# Patient Record
Sex: MALE | Race: WHITE | NOT HISPANIC OR LATINO | Employment: OTHER | URBAN - METROPOLITAN AREA
[De-identification: names, ages, dates, MRNs, and addresses within clinical notes are randomized per-mention and may not be internally consistent; named-entity substitution may affect disease eponyms.]

---

## 2023-04-04 LAB
AMPHETAMINE (PRESENCE) IN URINE BY SCREEN METHOD: NORMAL
ANION GAP IN SER/PLAS: 13 MMOL/L (ref 10–20)
BARBITURATES PRESENCE IN URINE BY SCREEN METHOD: NORMAL
BENZODIAZEPINE (PRESENCE) IN URINE BY SCREEN METHOD: NORMAL
CALCIUM (MG/DL) IN SER/PLAS: 9.6 MG/DL (ref 8.6–10.6)
CANNABINOIDS IN URINE BY SCREEN METHOD: NORMAL
CARBON DIOXIDE, TOTAL (MMOL/L) IN SER/PLAS: 25 MMOL/L (ref 21–32)
CHLORIDE (MMOL/L) IN SER/PLAS: 110 MMOL/L (ref 98–107)
CHOLESTEROL (MG/DL) IN SER/PLAS: 122 MG/DL (ref 0–199)
CHOLESTEROL IN HDL (MG/DL) IN SER/PLAS: 56 MG/DL
CHOLESTEROL/HDL RATIO: 2.2
COBALAMIN (VITAMIN B12) (PG/ML) IN SER/PLAS: 609 PG/ML (ref 211–911)
COCAINE (PRESENCE) IN URINE BY SCREEN METHOD: NORMAL
CREATININE (MG/DL) IN SER/PLAS: 1.73 MG/DL (ref 0.5–1.3)
DRUG SCREEN COMMENT URINE: NORMAL
ESTIMATED AVERAGE GLUCOSE FOR HBA1C: 134 MG/DL
FENTANYL URINE: NORMAL
GFR MALE: 41 ML/MIN/1.73M2
GLUCOSE (MG/DL) IN SER/PLAS: 92 MG/DL (ref 74–99)
HEMOGLOBIN A1C/HEMOGLOBIN TOTAL IN BLOOD: 6.3 %
LDL: 55 MG/DL (ref 0–99)
METHADONE (PRESENCE) IN URINE BY SCREEN METHOD: NORMAL
OPIATES (PRESENCE) IN URINE BY SCREEN METHOD: NORMAL
OXYCODONE (PRESENCE) IN URINE BY SCREEN METHOD: NORMAL
PHENCYCLIDINE (PRESENCE) IN URINE BY SCREEN METHOD: NORMAL
POTASSIUM (MMOL/L) IN SER/PLAS: 4.4 MMOL/L (ref 3.5–5.3)
SODIUM (MMOL/L) IN SER/PLAS: 144 MMOL/L (ref 136–145)
TRIGLYCERIDE (MG/DL) IN SER/PLAS: 53 MG/DL (ref 0–149)
UREA NITROGEN (MG/DL) IN SER/PLAS: 17 MG/DL (ref 6–23)
VLDL: 11 MG/DL (ref 0–40)

## 2023-07-13 PROBLEM — E11.40 DIABETIC NEUROPATHY (MULTI): Status: ACTIVE | Noted: 2023-07-13

## 2023-07-13 PROBLEM — E78.5 HYPERLIPIDEMIA: Status: ACTIVE | Noted: 2023-07-13

## 2023-07-13 PROBLEM — I49.8 ARRHYTHMIA, ATRIAL: Status: ACTIVE | Noted: 2023-07-13

## 2023-07-13 PROBLEM — M17.0 OSTEOARTHRITIS OF BOTH KNEES: Status: ACTIVE | Noted: 2023-07-13

## 2023-07-13 PROBLEM — N28.9 RENAL DYSFUNCTION: Status: ACTIVE | Noted: 2023-07-13

## 2023-07-13 PROBLEM — G89.29 CHRONIC RIGHT SHOULDER PAIN: Status: ACTIVE | Noted: 2023-07-13

## 2023-07-13 PROBLEM — E11.9 TYPE 2 DIABETES MELLITUS (MULTI): Status: ACTIVE | Noted: 2023-07-13

## 2023-07-13 PROBLEM — M25.511 CHRONIC RIGHT SHOULDER PAIN: Status: ACTIVE | Noted: 2023-07-13

## 2023-07-13 PROBLEM — R41.3 MEMORY CHANGE: Status: ACTIVE | Noted: 2023-07-13

## 2023-07-13 PROBLEM — K42.9 UMBILICAL HERNIA: Status: ACTIVE | Noted: 2023-07-13

## 2023-07-13 PROBLEM — N40.0 BPH (BENIGN PROSTATIC HYPERPLASIA): Status: ACTIVE | Noted: 2023-07-13

## 2023-07-13 RX ORDER — MONTELUKAST SODIUM 10 MG/1
1 TABLET ORAL DAILY
COMMUNITY
Start: 2013-01-15 | End: 2024-01-03

## 2023-07-13 RX ORDER — ATORVASTATIN CALCIUM 80 MG/1
1 TABLET, FILM COATED ORAL DAILY
COMMUNITY
Start: 2017-01-20

## 2023-07-13 RX ORDER — PIOGLITAZONEHYDROCHLORIDE 15 MG/1
1 TABLET ORAL DAILY
COMMUNITY
Start: 2019-06-17 | End: 2024-01-03

## 2023-07-13 RX ORDER — OMEPRAZOLE 20 MG/1
20 CAPSULE, DELAYED RELEASE ORAL
COMMUNITY
Start: 2023-03-10

## 2023-07-13 RX ORDER — GLIMEPIRIDE 2 MG/1
1 TABLET ORAL DAILY
COMMUNITY
Start: 2017-01-20

## 2023-07-13 RX ORDER — CYCLOBENZAPRINE HCL 10 MG
TABLET ORAL
COMMUNITY
Start: 2015-02-25

## 2023-07-13 RX ORDER — PREGABALIN 150 MG/1
1 CAPSULE ORAL 2 TIMES DAILY
COMMUNITY
Start: 2021-09-16 | End: 2023-10-17

## 2023-07-13 RX ORDER — TAMSULOSIN HYDROCHLORIDE 0.4 MG/1
1 CAPSULE ORAL DAILY
COMMUNITY
Start: 2012-11-20

## 2023-07-18 ENCOUNTER — APPOINTMENT (OUTPATIENT)
Dept: PRIMARY CARE | Facility: CLINIC | Age: 73
End: 2023-07-18
Payer: MEDICARE

## 2023-08-15 ENCOUNTER — OFFICE VISIT (OUTPATIENT)
Dept: PRIMARY CARE | Facility: CLINIC | Age: 73
End: 2023-08-15
Payer: MEDICARE

## 2023-08-15 VITALS
BODY MASS INDEX: 27.75 KG/M2 | WEIGHT: 222 LBS | HEART RATE: 82 BPM | DIASTOLIC BLOOD PRESSURE: 60 MMHG | TEMPERATURE: 96.6 F | SYSTOLIC BLOOD PRESSURE: 112 MMHG | OXYGEN SATURATION: 94 %

## 2023-08-15 DIAGNOSIS — Z00.00 ROUTINE GENERAL MEDICAL EXAMINATION AT HEALTH CARE FACILITY: Primary | ICD-10-CM

## 2023-08-15 DIAGNOSIS — E11.9 TYPE 2 DIABETES MELLITUS WITHOUT COMPLICATION, UNSPECIFIED WHETHER LONG TERM INSULIN USE (MULTI): ICD-10-CM

## 2023-08-15 LAB — HBA1C MFR BLD: 6.2 % (ref 4.2–6.5)

## 2023-08-15 PROCEDURE — 3044F HG A1C LEVEL LT 7.0%: CPT | Performed by: STUDENT IN AN ORGANIZED HEALTH CARE EDUCATION/TRAINING PROGRAM

## 2023-08-15 PROCEDURE — 99213 OFFICE O/P EST LOW 20 MIN: CPT | Performed by: STUDENT IN AN ORGANIZED HEALTH CARE EDUCATION/TRAINING PROGRAM

## 2023-08-15 PROCEDURE — 83036 HEMOGLOBIN GLYCOSYLATED A1C: CPT | Mod: CLIA WAIVED TEST | Performed by: STUDENT IN AN ORGANIZED HEALTH CARE EDUCATION/TRAINING PROGRAM

## 2023-08-15 PROCEDURE — G0439 PPPS, SUBSEQ VISIT: HCPCS | Performed by: STUDENT IN AN ORGANIZED HEALTH CARE EDUCATION/TRAINING PROGRAM

## 2023-08-15 PROCEDURE — 3074F SYST BP LT 130 MM HG: CPT | Performed by: STUDENT IN AN ORGANIZED HEALTH CARE EDUCATION/TRAINING PROGRAM

## 2023-08-15 PROCEDURE — 3078F DIAST BP <80 MM HG: CPT | Performed by: STUDENT IN AN ORGANIZED HEALTH CARE EDUCATION/TRAINING PROGRAM

## 2023-08-15 PROCEDURE — 1170F FXNL STATUS ASSESSED: CPT | Performed by: STUDENT IN AN ORGANIZED HEALTH CARE EDUCATION/TRAINING PROGRAM

## 2023-08-15 RX ORDER — MULTIVITAMIN
1 TABLET ORAL DAILY
COMMUNITY

## 2023-08-15 RX ORDER — CHOLECALCIFEROL (VITAMIN D3) 50 MCG
50 TABLET ORAL DAILY
COMMUNITY

## 2023-08-15 RX ORDER — CETIRIZINE HYDROCHLORIDE 10 MG/1
10 TABLET ORAL DAILY
COMMUNITY

## 2023-08-15 ASSESSMENT — ACTIVITIES OF DAILY LIVING (ADL)
TAKING_MEDICATION: INDEPENDENT
GROCERY_SHOPPING: INDEPENDENT
MANAGING_FINANCES: INDEPENDENT
DOING_HOUSEWORK: INDEPENDENT
BATHING: INDEPENDENT
DRESSING: INDEPENDENT

## 2023-08-15 ASSESSMENT — PATIENT HEALTH QUESTIONNAIRE - PHQ9
SUM OF ALL RESPONSES TO PHQ9 QUESTIONS 1 AND 2: 0
2. FEELING DOWN, DEPRESSED OR HOPELESS: NOT AT ALL
1. LITTLE INTEREST OR PLEASURE IN DOING THINGS: NOT AT ALL

## 2023-08-15 ASSESSMENT — ENCOUNTER SYMPTOMS
LOSS OF SENSATION IN FEET: 0
DEPRESSION: 0
OCCASIONAL FEELINGS OF UNSTEADINESS: 0

## 2023-08-15 NOTE — PROGRESS NOTES
Subjective   Reason for Visit: Andi Jovel is an 73 y.o. male here for a Medicare Wellness visit.          HPI    DM: well controlled on current meds with diabetic polyneuropathy on lyrica for which helps    HLD; stable at goal    Stress; taking care of wife with recent amputation      Utd on colonscopy  Recommend prevnar    controOARRS:  No data recorded  I have personally reviewed the OARRS report for Andi Jovel. I have considered the risks of abuse, dependence, addiction and diversion    Is the patient prescribed a combination of a benzodiazepine and opioid?  No    Last Urine Drug Screen / ordered today: No  Recent Results (from the past 01902 hour(s))   Drug Screen, Urine With Reflex to Confirmation    Collection Time: 04/04/23  1:33 PM   Result Value Ref Range    DRUG SCREEN COMMENT URINE SEE BELOW     Amphetamine Screen, Urine PRESUMPTIVE NEGATIVE NEGATIVE    Barbiturate Screen, Urine PRESUMPTIVE NEGATIVE NEGATIVE    BENZODIAZEPINE (PRESENCE) IN URINE BY SCREEN METHOD PRESUMPTIVE NEGATIVE NEGATIVE    Cannabinoid Screen, Urine PRESUMPTIVE NEGATIVE NEGATIVE    Cocaine Screen, Urine PRESUMPTIVE NEGATIVE NEGATIVE    Fentanyl, Ur PRESUMPTIVE NEGATIVE NEGATIVE    Methadone Screen, Urine PRESUMPTIVE NEGATIVE NEGATIVE    Opiate Screen, Urine PRESUMPTIVE NEGATIVE NEGATIVE    Oxycodone Screen, Ur PRESUMPTIVE NEGATIVE NEGATIVE    PCP Screen, Urine PRESUMPTIVE NEGATIVE NEGATIVE     Results are as expected.     Controlled Substance Agreement:  Date of the Last Agreement: 8/15/23  Reviewed Controlled Substance Agreement including but not limited to the benefits, risks, and alternatives to treatment with a Controlled Substance medication(s).    Lyrica:  What is the patient's goal of therapy? Diabetic neuropathy  Is this being achieved with current treatment? yes    Pain Assessment:  No data recorded    Activities of Daily Living:  Is your overall impression that this patient is benefiting (symptom reduction  outweighs side effects) from Lyrica therapy? Yes     1. Physical Functioning: Better  2. Family Relationship: Better  3. Social Relationship: Better  4. Mood: Better  5. Sleep Patterns: Better  6. Overall Function: Better      Patient Care Team:  Oni Roy DO as PCP - General (Internal Medicine)  Lul Wray MD as PCP - [facility] Attributed Provider     Review of Systems   All other systems reviewed and are negative.      Objective   Vitals:  /60 (BP Location: Right arm, Patient Position: Sitting, BP Cuff Size: Adult)   Pulse 82   Temp 35.9 °C (96.6 °F)   Wt 101 kg (222 lb)   SpO2 94%   BMI 27.75 kg/m²       Physical Exam  Constitutional:       Appearance: Normal appearance.   HENT:      Head: Normocephalic and atraumatic.      Right Ear: Tympanic membrane and ear canal normal.      Left Ear: Tympanic membrane and ear canal normal.      Mouth/Throat:      Mouth: Mucous membranes are moist.      Pharynx: Oropharynx is clear.   Eyes:      Extraocular Movements: Extraocular movements intact.      Conjunctiva/sclera: Conjunctivae normal.      Pupils: Pupils are equal, round, and reactive to light.   Cardiovascular:      Rate and Rhythm: Normal rate and regular rhythm.      Pulses: Normal pulses.      Heart sounds: Normal heart sounds.   Pulmonary:      Effort: Pulmonary effort is normal.      Breath sounds: Normal breath sounds.   Abdominal:      General: Abdomen is flat. Bowel sounds are normal.      Palpations: Abdomen is soft.   Musculoskeletal:         General: Normal range of motion.      Cervical back: Normal range of motion and neck supple.   Skin:     General: Skin is warm and dry.      Capillary Refill: Capillary refill takes 2 to 3 seconds.   Neurological:      General: No focal deficit present.      Mental Status: He is alert and oriented to person, place, and time. Mental status is at baseline.   Psychiatric:         Mood and Affect: Mood normal.         Behavior: Behavior normal.          Thought Content: Thought content normal.         Judgment: Judgment normal.         Assessment/Plan   Problem List Items Addressed This Visit       Type 2 diabetes mellitus (CMS/[facility])    Relevant Orders    POCT Glycosylated Hemoglobin (HGB A1C) docked device     1. Type 2 diabetes mellitus without complication, unspecified whether long term insulin use (CMS/[facility])  A1c well controlled continue meds  - POCT Glycosylated Hemoglobin (HGB A1C) docked device    2. Routine general medical examination at health care facility  - recent labs reviewed no issues  - 1 Year Follow Up In Primary Care - Wellness Exam; Future    3 Neuropathy  - oarrs reviewed  - csa signed  - lyrica refills as needed  - follow up in 6 months

## 2023-10-16 DIAGNOSIS — E11.40 TYPE 2 DIABETES MELLITUS WITH DIABETIC NEUROPATHY, UNSPECIFIED (MULTI): ICD-10-CM

## 2023-10-17 RX ORDER — PREGABALIN 150 MG/1
150 CAPSULE ORAL 2 TIMES DAILY
Qty: 60 CAPSULE | Refills: 3 | Status: SHIPPED | OUTPATIENT
Start: 2023-10-17 | End: 2024-02-19

## 2023-11-06 DIAGNOSIS — E11.9 TYPE 2 DIABETES MELLITUS WITHOUT COMPLICATION, WITHOUT LONG-TERM CURRENT USE OF INSULIN (MULTI): Primary | ICD-10-CM

## 2024-01-03 DIAGNOSIS — E11.9 TYPE 2 DIABETES MELLITUS WITHOUT COMPLICATION, WITHOUT LONG-TERM CURRENT USE OF INSULIN (MULTI): Primary | ICD-10-CM

## 2024-01-03 DIAGNOSIS — T78.40XA ALLERGY, INITIAL ENCOUNTER: ICD-10-CM

## 2024-01-03 RX ORDER — MONTELUKAST SODIUM 10 MG/1
10 TABLET ORAL DAILY
Qty: 90 TABLET | Refills: 3 | Status: SHIPPED | OUTPATIENT
Start: 2024-01-03

## 2024-01-03 RX ORDER — PIOGLITAZONEHYDROCHLORIDE 15 MG/1
15 TABLET ORAL DAILY
Qty: 90 TABLET | Refills: 3 | Status: SHIPPED | OUTPATIENT
Start: 2024-01-03

## 2024-02-15 ENCOUNTER — OFFICE VISIT (OUTPATIENT)
Dept: PRIMARY CARE | Facility: CLINIC | Age: 74
End: 2024-02-15
Payer: MEDICARE

## 2024-02-15 VITALS
BODY MASS INDEX: 28 KG/M2 | WEIGHT: 224 LBS | SYSTOLIC BLOOD PRESSURE: 122 MMHG | HEART RATE: 68 BPM | OXYGEN SATURATION: 99 % | DIASTOLIC BLOOD PRESSURE: 62 MMHG

## 2024-02-15 DIAGNOSIS — E11.42 DIABETIC POLYNEUROPATHY ASSOCIATED WITH TYPE 2 DIABETES MELLITUS (MULTI): Primary | ICD-10-CM

## 2024-02-15 DIAGNOSIS — E11.9 TYPE 2 DIABETES MELLITUS WITHOUT COMPLICATION, UNSPECIFIED WHETHER LONG TERM INSULIN USE (MULTI): ICD-10-CM

## 2024-02-15 LAB — HBA1C MFR BLD: 6.5 % (ref 4.2–6.5)

## 2024-02-15 PROCEDURE — 3078F DIAST BP <80 MM HG: CPT | Performed by: STUDENT IN AN ORGANIZED HEALTH CARE EDUCATION/TRAINING PROGRAM

## 2024-02-15 PROCEDURE — 90677 PCV20 VACCINE IM: CPT | Performed by: STUDENT IN AN ORGANIZED HEALTH CARE EDUCATION/TRAINING PROGRAM

## 2024-02-15 PROCEDURE — G0008 ADMIN INFLUENZA VIRUS VAC: HCPCS | Performed by: STUDENT IN AN ORGANIZED HEALTH CARE EDUCATION/TRAINING PROGRAM

## 2024-02-15 PROCEDURE — G0009 ADMIN PNEUMOCOCCAL VACCINE: HCPCS | Performed by: STUDENT IN AN ORGANIZED HEALTH CARE EDUCATION/TRAINING PROGRAM

## 2024-02-15 PROCEDURE — 99214 OFFICE O/P EST MOD 30 MIN: CPT | Performed by: STUDENT IN AN ORGANIZED HEALTH CARE EDUCATION/TRAINING PROGRAM

## 2024-02-15 PROCEDURE — 3074F SYST BP LT 130 MM HG: CPT | Performed by: STUDENT IN AN ORGANIZED HEALTH CARE EDUCATION/TRAINING PROGRAM

## 2024-02-15 PROCEDURE — 83036 HEMOGLOBIN GLYCOSYLATED A1C: CPT | Mod: CLIA WAIVED TEST | Performed by: STUDENT IN AN ORGANIZED HEALTH CARE EDUCATION/TRAINING PROGRAM

## 2024-02-15 PROCEDURE — 90662 IIV NO PRSV INCREASED AG IM: CPT | Performed by: STUDENT IN AN ORGANIZED HEALTH CARE EDUCATION/TRAINING PROGRAM

## 2024-02-15 PROCEDURE — 1159F MED LIST DOCD IN RCRD: CPT | Performed by: STUDENT IN AN ORGANIZED HEALTH CARE EDUCATION/TRAINING PROGRAM

## 2024-02-15 PROCEDURE — 3044F HG A1C LEVEL LT 7.0%: CPT | Performed by: STUDENT IN AN ORGANIZED HEALTH CARE EDUCATION/TRAINING PROGRAM

## 2024-02-15 PROCEDURE — 1036F TOBACCO NON-USER: CPT | Performed by: STUDENT IN AN ORGANIZED HEALTH CARE EDUCATION/TRAINING PROGRAM

## 2024-02-15 ASSESSMENT — PATIENT HEALTH QUESTIONNAIRE - PHQ9
2. FEELING DOWN, DEPRESSED OR HOPELESS: SEVERAL DAYS
10. IF YOU CHECKED OFF ANY PROBLEMS, HOW DIFFICULT HAVE THESE PROBLEMS MADE IT FOR YOU TO DO YOUR WORK, TAKE CARE OF THINGS AT HOME, OR GET ALONG WITH OTHER PEOPLE: NOT DIFFICULT AT ALL
1. LITTLE INTEREST OR PLEASURE IN DOING THINGS: NOT AT ALL
SUM OF ALL RESPONSES TO PHQ9 QUESTIONS 1 AND 2: 1

## 2024-02-15 NOTE — PROGRESS NOTES
Subjective   Patient ID: Andi Jovel is a 73 y.o. male who presents for Follow-up (6 months/Diabetes ).    HPI     DM: well controlled on current meds with diabetic polyneuropathy on lyrica for which helps     HLD; stable at goal     Stress; taking care of wife with recent amputation       Utd on colonscopy  Recommend prevnar    Diabetic neuropathy b/l feet pain and toes from dm on lyrica that helps it    OARRS:  No data recorded  I have personally reviewed the OARRS report for Andi Jovel. I have considered the risks of abuse, dependence, addiction and diversion    Is the patient prescribed a combination of a benzodiazepine and opioid?  Yes, I feel it is clincially indicated to continue the medication and have discussed with the patient risks/benefits/alternatives.    Last Urine Drug Screen / ordered today: No  Recent Results (from the past 8760 hour(s))   Drug Screen, Urine With Reflex to Confirmation    Collection Time: 04/04/23  1:33 PM   Result Value Ref Range    DRUG SCREEN COMMENT URINE SEE BELOW     Amphetamine Screen, Urine PRESUMPTIVE NEGATIVE NEGATIVE    Barbiturate Screen, Urine PRESUMPTIVE NEGATIVE NEGATIVE    BENZODIAZEPINE (PRESENCE) IN URINE BY SCREEN METHOD PRESUMPTIVE NEGATIVE NEGATIVE    Cannabinoid Screen, Urine PRESUMPTIVE NEGATIVE NEGATIVE    Cocaine Screen, Urine PRESUMPTIVE NEGATIVE NEGATIVE    Fentanyl, Ur PRESUMPTIVE NEGATIVE NEGATIVE    Methadone Screen, Urine PRESUMPTIVE NEGATIVE NEGATIVE    Opiate Screen, Urine PRESUMPTIVE NEGATIVE NEGATIVE    Oxycodone Screen, Ur PRESUMPTIVE NEGATIVE NEGATIVE    PCP Screen, Urine PRESUMPTIVE NEGATIVE NEGATIVE     Results are as expected.         Controlled Substance Agreement:  Date of the Last Agreement: 2/15  Reviewed Controlled Substance Agreement including but not limited to the benefits, risks, and alternatives to treatment with a Controlled Substance medication(s).    Lyrica:  What is the patient's goal of therapy? neuropahty  Is  this being achieved with current treatment? yes    Pain Assessment:  No data recorded    Activities of Daily Living:  Is your overall impression that this patient is benefiting (symptom reduction outweighs side effects) from Lyrica therapy? Yes     1. Physical Functioning: Better  2. Family Relationship: Better  3. Social Relationship: Better  4. Mood: Better  5. Sleep Patterns: Better    6. Overall Function: Better      Review of Systems   All other systems reviewed and are negative.      Objective   /62 (BP Location: Left arm, Patient Position: Sitting, BP Cuff Size: Adult long)   Pulse 68   Wt 102 kg (224 lb)   SpO2 99%   BMI 28.00 kg/m²     Physical Exam  Constitutional:       Appearance: Normal appearance.   HENT:      Head: Normocephalic and atraumatic.      Right Ear: Tympanic membrane and ear canal normal.      Left Ear: Tympanic membrane and ear canal normal.      Mouth/Throat:      Mouth: Mucous membranes are moist.      Pharynx: Oropharynx is clear.   Eyes:      Extraocular Movements: Extraocular movements intact.      Conjunctiva/sclera: Conjunctivae normal.      Pupils: Pupils are equal, round, and reactive to light.   Cardiovascular:      Rate and Rhythm: Normal rate and regular rhythm.      Pulses: Normal pulses.      Heart sounds: Normal heart sounds.   Pulmonary:      Effort: Pulmonary effort is normal.      Breath sounds: Normal breath sounds.   Abdominal:      General: Abdomen is flat. Bowel sounds are normal.      Palpations: Abdomen is soft.   Musculoskeletal:         General: Normal range of motion.      Cervical back: Normal range of motion and neck supple.   Skin:     General: Skin is warm and dry.      Capillary Refill: Capillary refill takes 2 to 3 seconds.   Neurological:      General: No focal deficit present.      Mental Status: He is alert and oriented to person, place, and time. Mental status is at baseline.   Psychiatric:         Mood and Affect: Mood normal.          Behavior: Behavior normal.         Thought Content: Thought content normal.         Judgment: Judgment normal.       Assessment/Plan   1. Type 2 diabetes mellitus without complication, unspecified whether long term insulin use (CMS/[facility])  Sugars well controlled doing wel no issues  - POCT Glycosylated Hemoglobin (HGB A1C) docked device    2. Neuropathy  - on lyrica  - call for refills  - uds on file  Csa on file    Follow up 6 monhts for wellness

## 2024-02-19 DIAGNOSIS — E11.40 TYPE 2 DIABETES MELLITUS WITH DIABETIC NEUROPATHY, UNSPECIFIED (MULTI): ICD-10-CM

## 2024-02-19 DIAGNOSIS — E11.42 DIABETIC POLYNEUROPATHY ASSOCIATED WITH TYPE 2 DIABETES MELLITUS (MULTI): ICD-10-CM

## 2024-02-19 RX ORDER — PREGABALIN 150 MG/1
150 CAPSULE ORAL 2 TIMES DAILY
Qty: 60 CAPSULE | Refills: 3 | Status: SHIPPED | OUTPATIENT
Start: 2024-02-19

## 2024-04-15 DIAGNOSIS — E11.9 TYPE 2 DIABETES MELLITUS WITHOUT COMPLICATION, WITHOUT LONG-TERM CURRENT USE OF INSULIN (MULTI): ICD-10-CM

## 2024-04-15 NOTE — TELEPHONE ENCOUNTER
FAX ORDER FORM TO [facility]    [phone]- NOT IN OUR SYSTEM.     SEE MEDIA FOR FORM.

## 2024-06-29 DIAGNOSIS — E11.42 DIABETIC POLYNEUROPATHY ASSOCIATED WITH TYPE 2 DIABETES MELLITUS (MULTI): ICD-10-CM

## 2024-06-29 DIAGNOSIS — E11.40 TYPE 2 DIABETES MELLITUS WITH DIABETIC NEUROPATHY, UNSPECIFIED (MULTI): ICD-10-CM

## 2024-07-01 RX ORDER — PREGABALIN 150 MG/1
150 CAPSULE ORAL 2 TIMES DAILY
Qty: 60 CAPSULE | Refills: 3 | Status: SHIPPED | OUTPATIENT
Start: 2024-07-01

## 2024-07-27 ENCOUNTER — APPOINTMENT (OUTPATIENT)
Dept: RADIOLOGY | Facility: HOSPITAL | Age: 74
End: 2024-07-27
Payer: MEDICARE

## 2024-07-27 ENCOUNTER — APPOINTMENT (OUTPATIENT)
Dept: CARDIOLOGY | Facility: HOSPITAL | Age: 74
End: 2024-07-27
Payer: MEDICARE

## 2024-07-27 ENCOUNTER — HOSPITAL ENCOUNTER (EMERGENCY)
Facility: HOSPITAL | Age: 74
Discharge: HOME | End: 2024-07-27
Attending: EMERGENCY MEDICINE
Payer: MEDICARE

## 2024-07-27 VITALS
OXYGEN SATURATION: 95 % | HEIGHT: 75 IN | HEART RATE: 58 BPM | SYSTOLIC BLOOD PRESSURE: 122 MMHG | WEIGHT: 230 LBS | DIASTOLIC BLOOD PRESSURE: 59 MMHG | TEMPERATURE: 97.5 F | RESPIRATION RATE: 18 BRPM | BODY MASS INDEX: 28.6 KG/M2
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
## 2024-07-27 DIAGNOSIS — N18.9 ACUTE KIDNEY INJURY SUPERIMPOSED ON CHRONIC KIDNEY DISEASE (CMS-HCC): ICD-10-CM

## 2024-07-27 DIAGNOSIS — S22.31XA CLOSED FRACTURE OF ONE RIB OF RIGHT SIDE, INITIAL ENCOUNTER: ICD-10-CM

## 2024-07-27 DIAGNOSIS — N17.9 ACUTE KIDNEY INJURY SUPERIMPOSED ON CHRONIC KIDNEY DISEASE (CMS-HCC): ICD-10-CM

## 2024-07-27 LAB
ALBUMIN SERPL BCP-MCNC: 4.4 G/DL (ref 3.4–5)
ALP SERPL-CCNC: 80 U/L (ref 33–136)
ALT SERPL W P-5'-P-CCNC: 13 U/L (ref 10–52)
ANION GAP SERPL CALC-SCNC: 13 MMOL/L (ref 10–20)
APPEARANCE UR: CLEAR
AST SERPL W P-5'-P-CCNC: 16 U/L (ref 9–39)
BASOPHILS # BLD AUTO: 0.08 X10*3/UL (ref 0–0.1)
BASOPHILS NFR BLD AUTO: 1.1 %
BILIRUB SERPL-MCNC: 0.9 MG/DL (ref 0–1.2)
BILIRUB UR STRIP.AUTO-MCNC: NEGATIVE MG/DL
BUN SERPL-MCNC: 28 MG/DL (ref 6–23)
CALCIUM SERPL-MCNC: 9.7 MG/DL (ref 8.6–10.3)
CARDIAC TROPONIN I PNL SERPL HS: 7 NG/L (ref 0–20)
CHLORIDE SERPL-SCNC: 107 MMOL/L (ref 98–107)
CO2 SERPL-SCNC: 25 MMOL/L (ref 21–32)
COLOR UR: NORMAL
CREAT SERPL-MCNC: 1.79 MG/DL (ref 0.5–1.3)
EGFRCR SERPLBLD CKD-EPI 2021: 39 ML/MIN/1.73M*2
EOSINOPHIL # BLD AUTO: 0.17 X10*3/UL (ref 0–0.4)
EOSINOPHIL NFR BLD AUTO: 2.4 %
ERYTHROCYTE [DISTWIDTH] IN BLOOD BY AUTOMATED COUNT: 12.5 % (ref 11.5–14.5)
GLUCOSE BLD MANUAL STRIP-MCNC: 96 MG/DL (ref 74–99)
GLUCOSE SERPL-MCNC: 129 MG/DL (ref 74–99)
GLUCOSE UR STRIP.AUTO-MCNC: NORMAL MG/DL
HCT VFR BLD AUTO: 38.7 % (ref 41–52)
HGB BLD-MCNC: 12.9 G/DL (ref 13.5–17.5)
IMM GRANULOCYTES # BLD AUTO: 0.03 X10*3/UL (ref 0–0.5)
IMM GRANULOCYTES NFR BLD AUTO: 0.4 % (ref 0–0.9)
KETONES UR STRIP.AUTO-MCNC: NEGATIVE MG/DL
LACTATE SERPL-SCNC: 1.8 MMOL/L (ref 0.4–2)
LEUKOCYTE ESTERASE UR QL STRIP.AUTO: NEGATIVE
LYMPHOCYTES # BLD AUTO: 2.04 X10*3/UL (ref 0.8–3)
LYMPHOCYTES NFR BLD AUTO: 28.4 %
MAGNESIUM SERPL-MCNC: 2.02 MG/DL (ref 1.6–2.4)
MCH RBC QN AUTO: 30.1 PG (ref 26–34)
MCHC RBC AUTO-ENTMCNC: 33.3 G/DL (ref 32–36)
MCV RBC AUTO: 90 FL (ref 80–100)
MONOCYTES # BLD AUTO: 0.5 X10*3/UL (ref 0.05–0.8)
MONOCYTES NFR BLD AUTO: 7 %
NEUTROPHILS # BLD AUTO: 4.37 X10*3/UL (ref 1.6–5.5)
NEUTROPHILS NFR BLD AUTO: 60.7 %
NITRITE UR QL STRIP.AUTO: NEGATIVE
NRBC BLD-RTO: 0 /100 WBCS (ref 0–0)
PH UR STRIP.AUTO: 6 [PH]
PLATELET # BLD AUTO: 201 X10*3/UL (ref 150–450)
POTASSIUM SERPL-SCNC: 4.6 MMOL/L (ref 3.5–5.3)
PROT SERPL-MCNC: 7 G/DL (ref 6.4–8.2)
PROT UR STRIP.AUTO-MCNC: NEGATIVE MG/DL
RBC # BLD AUTO: 4.28 X10*6/UL (ref 4.5–5.9)
RBC # UR STRIP.AUTO: NEGATIVE /UL
SODIUM SERPL-SCNC: 140 MMOL/L (ref 136–145)
SP GR UR STRIP.AUTO: 1.01
UROBILINOGEN UR STRIP.AUTO-MCNC: NORMAL MG/DL
WBC # BLD AUTO: 7.2 X10*3/UL (ref 4.4–11.3)

## 2024-07-27 PROCEDURE — 72131 CT LUMBAR SPINE W/O DYE: CPT | Mod: RCN

## 2024-07-27 PROCEDURE — 74176 CT ABD & PELVIS W/O CONTRAST: CPT

## 2024-07-27 PROCEDURE — 70450 CT HEAD/BRAIN W/O DYE: CPT

## 2024-07-27 PROCEDURE — 72131 CT LUMBAR SPINE W/O DYE: CPT

## 2024-07-27 PROCEDURE — 2500000004 HC RX 250 GENERAL PHARMACY W/ HCPCS (ALT 636 FOR OP/ED)

## 2024-07-27 PROCEDURE — 72125 CT NECK SPINE W/O DYE: CPT

## 2024-07-27 PROCEDURE — 84484 ASSAY OF TROPONIN QUANT: CPT

## 2024-07-27 PROCEDURE — 99285 EMERGENCY DEPT VISIT HI MDM: CPT

## 2024-07-27 PROCEDURE — 71101 X-RAY EXAM UNILAT RIBS/CHEST: CPT | Mod: RIGHT SIDE | Performed by: RADIOLOGY

## 2024-07-27 PROCEDURE — 71101 X-RAY EXAM UNILAT RIBS/CHEST: CPT | Mod: RT

## 2024-07-27 PROCEDURE — 96361 HYDRATE IV INFUSION ADD-ON: CPT

## 2024-07-27 PROCEDURE — 85025 COMPLETE CBC W/AUTO DIFF WBC: CPT

## 2024-07-27 PROCEDURE — 72128 CT CHEST SPINE W/O DYE: CPT

## 2024-07-27 PROCEDURE — 36415 COLL VENOUS BLD VENIPUNCTURE: CPT

## 2024-07-27 PROCEDURE — 96374 THER/PROPH/DIAG INJ IV PUSH: CPT

## 2024-07-27 PROCEDURE — 80053 COMPREHEN METABOLIC PANEL: CPT

## 2024-07-27 PROCEDURE — 81003 URINALYSIS AUTO W/O SCOPE: CPT

## 2024-07-27 PROCEDURE — 82947 ASSAY GLUCOSE BLOOD QUANT: CPT

## 2024-07-27 PROCEDURE — 72128 CT CHEST SPINE W/O DYE: CPT | Mod: RCN

## 2024-07-27 PROCEDURE — 93005 ELECTROCARDIOGRAM TRACING: CPT

## 2024-07-27 PROCEDURE — 71250 CT THORAX DX C-: CPT

## 2024-07-27 PROCEDURE — 83735 ASSAY OF MAGNESIUM: CPT

## 2024-07-27 PROCEDURE — 83605 ASSAY OF LACTIC ACID: CPT

## 2024-07-27 RX ORDER — OXYCODONE AND ACETAMINOPHEN 5; 325 MG/1; MG/1
1 TABLET ORAL EVERY 8 HOURS PRN
Qty: 5 TABLET | Refills: 0 | Status: SHIPPED | OUTPATIENT
Start: 2024-07-27 | End: 2024-07-30

## 2024-07-27 RX ORDER — MORPHINE SULFATE 4 MG/ML
2 INJECTION, SOLUTION INTRAMUSCULAR; INTRAVENOUS ONCE
Status: COMPLETED | OUTPATIENT
Start: 2024-07-27 | End: 2024-07-27

## 2024-07-27 RX ADMIN — MORPHINE SULFATE 2 MG: 4 INJECTION, SOLUTION INTRAMUSCULAR; INTRAVENOUS at 13:40

## 2024-07-27 RX ADMIN — SODIUM CHLORIDE 1000 ML: 9 INJECTION, SOLUTION INTRAVENOUS at 14:20

## 2024-07-27 ASSESSMENT — COLUMBIA-SUICIDE SEVERITY RATING SCALE - C-SSRS
1. IN THE PAST MONTH, HAVE YOU WISHED YOU WERE DEAD OR WISHED YOU COULD GO TO SLEEP AND NOT WAKE UP?: NO
2. HAVE YOU ACTUALLY HAD ANY THOUGHTS OF KILLING YOURSELF?: NO
6. HAVE YOU EVER DONE ANYTHING, STARTED TO DO ANYTHING, OR PREPARED TO DO ANYTHING TO END YOUR LIFE?: NO

## 2024-07-27 ASSESSMENT — LIFESTYLE VARIABLES
EVER FELT BAD OR GUILTY ABOUT YOUR DRINKING: NO
HAVE YOU EVER FELT YOU SHOULD CUT DOWN ON YOUR DRINKING: NO
TOTAL SCORE: 0
EVER HAD A DRINK FIRST THING IN THE MORNING TO STEADY YOUR NERVES TO GET RID OF A HANGOVER: NO
HAVE PEOPLE ANNOYED YOU BY CRITICIZING YOUR DRINKING: NO

## 2024-07-27 ASSESSMENT — PAIN SCALES - GENERAL
PAINLEVEL_OUTOF10: 7
PAINLEVEL_OUTOF10: 9

## 2024-07-27 ASSESSMENT — PAIN DESCRIPTION - PAIN TYPE: TYPE: ACUTE PAIN

## 2024-07-27 ASSESSMENT — PAIN - FUNCTIONAL ASSESSMENT: PAIN_FUNCTIONAL_ASSESSMENT: 0-10

## 2024-07-27 ASSESSMENT — PAIN DESCRIPTION - LOCATION: LOCATION: RIB CAGE

## 2024-07-27 ASSESSMENT — PAIN DESCRIPTION - ORIENTATION: ORIENTATION: RIGHT

## 2024-07-27 NOTE — ED PROVIDER NOTES
"HPI   Chief Complaint   Patient presents with    Rib Injury       Andi is a 73-year-old male with a past medical history of non-insulin-dependent T2DM and hyperlipidemia presenting to the emergency department with a rib injury.  Patient states that yesterday, he was working on cars with his friends when he suffered a fall.  States that he was lying underneath the car, proceeded to stand up, and then before he knew it he was on the ground.  Patient cannot specifically state if this was a mechanical fall or if he \"passed out \".  He cannot remember exactly what happened.  He does know that he was by a barstool when he came to and believes that he hit his right side on the barstool.  He is now endorsing pain in the right lateral chest.  He denies any midsternal chest pain.  The pain can sometimes take his breath away, but he does not feel short of breath. Denies hemoptysis. He does not take any blood thinners. HE has chronic low back pain, but states it may be worse today. No urinary or bowel incontinence. No saddle anesthesia. He is otherwise feeling healthy and denies any numbness, tingling, lightheadedness, dizziness, nausea, vomiting, urinary symptoms, abdominal pain, diarrhea, etc. Patient has been able to ambulate.               Patient History   No past medical history on file.  No past surgical history on file.  No family history on file.  Social History     Tobacco Use    Smoking status: Never    Smokeless tobacco: Never   Vaping Use    Vaping status: Never Used   Substance Use Topics    Alcohol use: Yes     Comment: occasional    Drug use: Never       Physical Exam   ED Triage Vitals [07/27/24 1206]   Temperature Heart Rate Respirations BP   36.4 °C (97.5 °F) 85 18 134/72      Pulse Ox Temp Source Heart Rate Source Patient Position   96 % Temporal -- --      BP Location FiO2 (%)     -- --       Physical Exam  Constitutional:       Appearance: He is obese.   HENT:      Head:      Comments: No Levy sign or " raccoon eyes.     Right Ear: Tympanic membrane, ear canal and external ear normal.      Left Ear: Tympanic membrane, ear canal and external ear normal.      Ears:      Comments: No hemotympanum.     Nose: No rhinorrhea.      Mouth/Throat:      Mouth: Mucous membranes are moist.      Pharynx: Oropharynx is clear.   Eyes:      Extraocular Movements: Extraocular movements intact.      Pupils: Pupils are equal, round, and reactive to light.   Neck:      Comments: No tenderness palpation of the midline cervical spine.  Cardiovascular:      Rate and Rhythm: Normal rate and regular rhythm.      Pulses: Normal pulses.      Heart sounds: Normal heart sounds.   Pulmonary:      Effort: Pulmonary effort is normal.      Breath sounds: Normal breath sounds.      Comments: Patient with tenderness to palpation of the right, lateral chest near the mid- distal ribs.  There is crepitus upon palpation with high suspicion for rib fracture.  Breath sounds normal.  Chest:      Chest wall: Tenderness present.   Abdominal:      General: Abdomen is flat. There is no distension.      Palpations: Abdomen is soft.      Tenderness: There is no abdominal tenderness. There is no guarding or rebound.   Musculoskeletal:         General: Tenderness and signs of injury present. No swelling or deformity.      Cervical back: Normal range of motion. No rigidity or tenderness.      Comments: Tenderness palpation of the right, lateral chest overlying the mid to distal ribs.   Skin:     Capillary Refill: Capillary refill takes less than 2 seconds.   Neurological:      General: No focal deficit present.      Mental Status: He is alert and oriented to person, place, and time.           ED Course & MDM   ED Course as of 07/27/24 2324   Sat Jul 27, 2024   1307 XR ribs right 2 views w chest pa or ap  IMPRESSION:  1.  No displaced fracture identified.  2.  No focal infiltrate or pneumothorax identified.      My interpretation, there does appear to be an acute  distal rib fracture approximately around the seventh eighth rib. [AH]   1414 Blood Urea Nitrogen(!): 28 [AH]   1414 Creatinine(!): 1.79 [AH]   1414 EGFR(!): 39 [AH]   1453 CT cervical spine wo IV contrast  No evidence for an acute fracture or subluxation of the cervical  spine.      Severe discogenic degenerative change C5-6 and C6-7   [AH]   1453 CT head wo IV contrast      IMPRESSION:  No evidence of acute cortical infarct or intracranial hemorrhage.      No evidence of intracranial hemorrhage or displaced skull fracture.   [AH]   1457 EKG interpreted by ED physician.  Sinus bradycardia at 57 bpm.  No STEMI.  DC is prolonged at 246.  QTc 461.  There is a PVC.  Patient remains asymptomatic without any chest pain or shortness of breath. [AH]   1524 CT chest abdomen pelvis wo IV contrast  IMPRESSION:  Chest  1.  Nondisplaced fracture lateral right 9th rib  2. No fractures are noted in the thoracic vertebra.  3. 5 mm pleural-based nodule posterior right apex requires no  specific follow-up.      Abdomen-Pelvis  1.  No fractures noted in the lumbar vertebra  2. No solid organ injury is identified  3. Multiple bilateral renal cysts  4. Bilateral punctate nonobstructive renal stones   [AH]      ED Course User Index  [AH] Allie Beltre PA-C         Diagnoses as of 07/27/24 2324   Fall, initial encounter   Closed fracture of one rib of right side, initial encounter   Acute kidney injury superimposed on chronic kidney disease (CMS-HCC)                       [place] Coma Scale Score: 15                        Medical Decision Making  Andi is a 73-year-old male with a past medical history of non-insulin-dependent T2DM and hyperlipidemia presenting to the emergency department with a rib injury after a fall.     Medical Decision Making: He did not appear ill or toxic.  Vital signs reviewed and stable.  Patient with tenderness to palpation of the right lateral chest. Patient fall is unknown mechanical vs syncope. He  "believes he may have tripped and fell, but \"can't remember exactly\". Considering this and his age, comprehensive workup was pursued. Workup included CBC, CMP, magnesium, lactate, troponin, UA, EKG, chest x-ray with lateral rib view, CT of the head, cervical spine, thoracic spine, lumbar spine, chest/abdomen/pelvis without IV contrast.  There is no leukocytosis on CBC.  H&H are decreased at 12.9 and 38.7.  No prior labs to compare to, patient does not have any signs or symptoms of acute blood loss.  CMP with hyperglycemia 129.  Kidney function is slightly worse than baseline with BUN elevated at 28, creatinine elevated at 1.79, GFR decreased at 39.  Lactate, magnesium, and troponin unremarkable.  No signs of acute infection on UA.  EKG shows no acute ischemia.  Chest x-ray was read as unremarkable, but on my patient there appears to be a right-sided, distal rib fracture.  CT of the chest confirms findings of a nondisplaced lateral, right rib fracture.  Otherwise, CT imaging shows no acute findings.  In the emergency room, patient was given an IV normal saline bolus and morphine for pain control.  At this time, he will be discharged home and recommended follow-up with a primary care provider.  He was able to ambulate prior to discharge.  Patient instructed on symptom spirometer and its purpose and preventing atelectasis/pneumonia.  Patient should return the emergency room develops shortness of breath, chest pain, severe headache, persistent nauseous vomiting.  He was agreeable to plan all question concerns were addressed.     Differential diagnoses considered: Fracture, dislocation, pneumothorax, pneumonia, intracranial hemorrhage, intracranial mass, NSTEMI, STEMI, arrhythmia, electrolyte disturbance, MARY, sepsis, malignancy, etc.     Medications given: IV normal saline bolus      Diagnosis: Acute kidney injury superimposed on chronic kidney disease, closed fracture of one rib of right side, fall    Plan: " Discharge                  Procedure  Procedures     Allie Beltre PA-C  07/27/24 2818

## 2024-07-27 NOTE — DISCHARGE INSTRUCTIONS
You are seen emergency room today after a fall.  Performed a comprehensive workup today including blood work, CT imaging, urine test, and an EKG.  Your lab work was generally well-appearing.  Your kidney function was a little worse than it normally is.  For this, you were given a dose of IV fluids.  I recommend following up with your primary care provider and having repeat labs completed in the next several days.  In the meantime, try to hydrate orally.  Imaging today showed a rib fracture on your right side.  For this, we recommend supportive care including pain control, rest, ice/heat, and again hydration.  I provided you with a prescription for Percocet.  Please be advised this medication is a controlled substance and contains an opioid. Only take this medication while at home and not while driving machinery.  The medication also has Tylenol in it.  So please be advised that how much Tylenol you are taking in addition to it.  You were also provided with an incentive spirometer.  This device helps make sure that you are taking deep breaths and prevent pneumonia.  Please follow-up with your primary care provider regarding your visit to the ED today.  Reasons to return include shortness of breath, chest pain, severe headache.

## 2024-07-27 NOTE — ED TRIAGE NOTES
Pt BIBA with complaints of right sided rib pain. Per pt, fell onto a steel bar stool early yesterday afternoon. Pt states woke up this morning with increased pain and wants to be checked out. Pt denies LOC, no head neck or back pain. No blood thinners.

## 2024-07-28 LAB — HOLD SPECIMEN: NORMAL

## 2024-07-31 LAB
ATRIAL RATE: 57 BPM
P AXIS: 15 DEGREES
PR INTERVAL: 246 MS
Q ONSET: 252 MS
QRS COUNT: 9 BEATS
QRS DURATION: 145 MS
QT INTERVAL: 473 MS
QTC CALCULATION(BAZETT): 461 MS
QTC FREDERICIA: 465 MS
R AXIS: -34 DEGREES
T AXIS: 34 DEGREES
T OFFSET: 489 MS
VENTRICULAR RATE: 57 BPM

## 2024-08-20 ENCOUNTER — APPOINTMENT (OUTPATIENT)
Dept: PRIMARY CARE | Facility: CLINIC | Age: 74
End: 2024-08-20
Payer: MEDICARE

## 2024-08-27 ENCOUNTER — APPOINTMENT (OUTPATIENT)
Dept: PRIMARY CARE | Facility: CLINIC | Age: 74
End: 2024-08-27
Payer: MEDICARE

## 2024-08-27 VITALS
WEIGHT: 215 LBS | HEIGHT: 75 IN | HEART RATE: 62 BPM | BODY MASS INDEX: 26.73 KG/M2 | DIASTOLIC BLOOD PRESSURE: 62 MMHG | SYSTOLIC BLOOD PRESSURE: 136 MMHG | OXYGEN SATURATION: 96 %

## 2024-08-27 DIAGNOSIS — T78.40XA ALLERGY, INITIAL ENCOUNTER: ICD-10-CM

## 2024-08-27 DIAGNOSIS — E11.9 TYPE 2 DIABETES MELLITUS WITHOUT COMPLICATION, WITHOUT LONG-TERM CURRENT USE OF INSULIN (MULTI): ICD-10-CM

## 2024-08-27 DIAGNOSIS — M54.16 LUMBAR RADICULOPATHY: ICD-10-CM

## 2024-08-27 DIAGNOSIS — E11.9 TYPE 2 DIABETES MELLITUS WITHOUT COMPLICATION, UNSPECIFIED WHETHER LONG TERM INSULIN USE (MULTI): ICD-10-CM

## 2024-08-27 DIAGNOSIS — E11.42 DIABETIC POLYNEUROPATHY ASSOCIATED WITH TYPE 2 DIABETES MELLITUS (MULTI): ICD-10-CM

## 2024-08-27 DIAGNOSIS — Z00.00 ROUTINE GENERAL MEDICAL EXAMINATION AT A HEALTH CARE FACILITY: ICD-10-CM

## 2024-08-27 DIAGNOSIS — Z79.899 CONTROLLED SUBSTANCE AGREEMENT SIGNED: ICD-10-CM

## 2024-08-27 DIAGNOSIS — Z00.00 ROUTINE GENERAL MEDICAL EXAMINATION AT HEALTH CARE FACILITY: Primary | ICD-10-CM

## 2024-08-27 LAB
AMPHETAMINES UR QL SCN: NORMAL
BARBITURATES UR QL SCN: NORMAL
BENZODIAZ UR QL SCN: NORMAL
BZE UR QL SCN: NORMAL
CANNABINOIDS UR QL SCN: NORMAL
CHOLEST SERPL-MCNC: 126 MG/DL (ref 0–199)
CHOLESTEROL/HDL RATIO: 2.4
EST. AVERAGE GLUCOSE BLD GHB EST-MCNC: 134 MG/DL
FENTANYL+NORFENTANYL UR QL SCN: NORMAL
HBA1C MFR BLD: 6 % (ref 4.2–6.5)
HBA1C MFR BLD: 6.3 %
HDLC SERPL-MCNC: 52.3 MG/DL
LDLC SERPL CALC-MCNC: 61 MG/DL
METHADONE UR QL SCN: NORMAL
NON HDL CHOLESTEROL: 74 MG/DL (ref 0–149)
OPIATES UR QL SCN: NORMAL
OXYCODONE+OXYMORPHONE UR QL SCN: NORMAL
PCP UR QL SCN: NORMAL
PSA SERPL-MCNC: 3.4 NG/ML
TRIGL SERPL-MCNC: 62 MG/DL (ref 0–149)
TSH SERPL-ACNC: 0.75 MIU/L (ref 0.44–3.98)
VLDL: 12 MG/DL (ref 0–40)

## 2024-08-27 PROCEDURE — 3075F SYST BP GE 130 - 139MM HG: CPT | Performed by: STUDENT IN AN ORGANIZED HEALTH CARE EDUCATION/TRAINING PROGRAM

## 2024-08-27 PROCEDURE — 3078F DIAST BP <80 MM HG: CPT | Performed by: STUDENT IN AN ORGANIZED HEALTH CARE EDUCATION/TRAINING PROGRAM

## 2024-08-27 PROCEDURE — 1170F FXNL STATUS ASSESSED: CPT | Performed by: STUDENT IN AN ORGANIZED HEALTH CARE EDUCATION/TRAINING PROGRAM

## 2024-08-27 PROCEDURE — 1123F ACP DISCUSS/DSCN MKR DOCD: CPT | Performed by: STUDENT IN AN ORGANIZED HEALTH CARE EDUCATION/TRAINING PROGRAM

## 2024-08-27 PROCEDURE — 1159F MED LIST DOCD IN RCRD: CPT | Performed by: STUDENT IN AN ORGANIZED HEALTH CARE EDUCATION/TRAINING PROGRAM

## 2024-08-27 PROCEDURE — 83036 HEMOGLOBIN GLYCOSYLATED A1C: CPT | Mod: CLIA WAIVED TEST | Performed by: STUDENT IN AN ORGANIZED HEALTH CARE EDUCATION/TRAINING PROGRAM

## 2024-08-27 PROCEDURE — 3044F HG A1C LEVEL LT 7.0%: CPT | Performed by: STUDENT IN AN ORGANIZED HEALTH CARE EDUCATION/TRAINING PROGRAM

## 2024-08-27 PROCEDURE — 1036F TOBACCO NON-USER: CPT | Performed by: STUDENT IN AN ORGANIZED HEALTH CARE EDUCATION/TRAINING PROGRAM

## 2024-08-27 PROCEDURE — 1158F ADVNC CARE PLAN TLK DOCD: CPT | Performed by: STUDENT IN AN ORGANIZED HEALTH CARE EDUCATION/TRAINING PROGRAM

## 2024-08-27 PROCEDURE — 99213 OFFICE O/P EST LOW 20 MIN: CPT | Performed by: STUDENT IN AN ORGANIZED HEALTH CARE EDUCATION/TRAINING PROGRAM

## 2024-08-27 PROCEDURE — G0439 PPPS, SUBSEQ VISIT: HCPCS | Performed by: STUDENT IN AN ORGANIZED HEALTH CARE EDUCATION/TRAINING PROGRAM

## 2024-08-27 RX ORDER — GLIMEPIRIDE 2 MG/1
2 TABLET ORAL DAILY
Qty: 90 TABLET | Refills: 1 | Status: SHIPPED | OUTPATIENT
Start: 2024-08-27

## 2024-08-27 RX ORDER — PIOGLITAZONEHYDROCHLORIDE 15 MG/1
15 TABLET ORAL DAILY
Qty: 90 TABLET | Refills: 3 | Status: SHIPPED | OUTPATIENT
Start: 2024-08-27

## 2024-08-27 RX ORDER — ATORVASTATIN CALCIUM 80 MG/1
80 TABLET, FILM COATED ORAL DAILY
Qty: 90 TABLET | Refills: 1 | Status: SHIPPED | OUTPATIENT
Start: 2024-08-27

## 2024-08-27 RX ORDER — MONTELUKAST SODIUM 10 MG/1
10 TABLET ORAL DAILY
Qty: 90 TABLET | Refills: 3 | Status: SHIPPED | OUTPATIENT
Start: 2024-08-27

## 2024-08-27 RX ORDER — ALCOHOL 2.38 KG/3.79L
1 GEL TOPICAL DAILY
Qty: 90 CAPSULE | Refills: 3 | Status: SHIPPED | OUTPATIENT
Start: 2024-08-27

## 2024-08-27 ASSESSMENT — PATIENT HEALTH QUESTIONNAIRE - PHQ9
SUM OF ALL RESPONSES TO PHQ9 QUESTIONS 1 AND 2: 0
2. FEELING DOWN, DEPRESSED OR HOPELESS: NOT AT ALL
SUM OF ALL RESPONSES TO PHQ9 QUESTIONS 1 AND 2: 0
1. LITTLE INTEREST OR PLEASURE IN DOING THINGS: NOT AT ALL
1. LITTLE INTEREST OR PLEASURE IN DOING THINGS: NOT AT ALL
2. FEELING DOWN, DEPRESSED OR HOPELESS: NOT AT ALL

## 2024-08-27 ASSESSMENT — ACTIVITIES OF DAILY LIVING (ADL)
DOING_HOUSEWORK: INDEPENDENT
MANAGING_FINANCES: INDEPENDENT
BATHING: INDEPENDENT
GROCERY_SHOPPING: INDEPENDENT
TAKING_MEDICATION: INDEPENDENT
DRESSING: INDEPENDENT

## 2024-08-27 ASSESSMENT — ENCOUNTER SYMPTOMS
LOSS OF SENSATION IN FEET: 0
OCCASIONAL FEELINGS OF UNSTEADINESS: 0
DEPRESSION: 0

## 2024-08-27 NOTE — PROGRESS NOTES
"Subjective   Patient ID: Andi Jovel is a 74 y.o. male who presents for Medicare Annual Wellness Visit Subsequent (Fasting ) and Diabetes.    HPI     DM: well controlled on current meds with diabetic polyneuropathy on lyrica for which helps     HLD; stable at goal     Stress; taking care of wife with recent amputation      OARRS:  No data recorded  I have personally reviewed the OARRS report for Andi Jovel. I have considered the risks of abuse, dependence, addiction and diversion    Is the patient prescribed a combination of a benzodiazepine and opioid?  No    Last Urine Drug Screen / ordered today: Yes  No results found for this or any previous visit (from the past 8760 hour(s)).  Results are as expected.           Controlled Substance Agreement:  Date of the Last Agreement: 2024  Reviewed Controlled Substance Agreement including but not limited to the benefits, risks, and alternatives to treatment with a Controlled Substance medication(s).    Lyrica:  What is the patient's goal of therapy? 2neuropathy  Is this being achieved with current treatment? yes    Pain Assessment:  No data recorded    Activities of Daily Living:  Is your overall impression that this patient is benefiting (symptom reduction outweighs side effects) from Lyrica therapy? Yes     1. Physical Functioning: Better  2. Family Relationship: Better  3. Social Relationship: Better  4. Mood: Better  5. Sleep Patterns: Better  6. Overall Function: Better       Utd on colonscopy  Recommend prevnar    Review of Systems   All other systems reviewed and are negative.      Objective   /62 (BP Location: Right arm, Patient Position: Sitting, BP Cuff Size: Small adult)   Pulse 62   Ht 1.905 m (6' 3\")   Wt 97.5 kg (215 lb)   SpO2 96%   BMI 26.87 kg/m²     Physical Exam  Constitutional:       Appearance: Normal appearance.   HENT:      Head: Normocephalic and atraumatic.      Right Ear: Tympanic membrane and ear canal normal.      Left " Ear: Tympanic membrane and ear canal normal.      Mouth/Throat:      Mouth: Mucous membranes are moist.      Pharynx: Oropharynx is clear.   Eyes:      Extraocular Movements: Extraocular movements intact.      Conjunctiva/sclera: Conjunctivae normal.      Pupils: Pupils are equal, round, and reactive to light.   Cardiovascular:      Rate and Rhythm: Normal rate and regular rhythm.      Pulses: Normal pulses.      Heart sounds: Normal heart sounds.   Pulmonary:      Effort: Pulmonary effort is normal.      Breath sounds: Normal breath sounds.   Abdominal:      General: Abdomen is flat. Bowel sounds are normal.      Palpations: Abdomen is soft.   Musculoskeletal:         General: Normal range of motion.      Cervical back: Normal range of motion and neck supple.   Skin:     General: Skin is warm and dry.      Capillary Refill: Capillary refill takes 2 to 3 seconds.   Neurological:      General: No focal deficit present.      Mental Status: He is alert and oriented to person, place, and time. Mental status is at baseline.   Psychiatric:         Mood and Affect: Mood normal.         Behavior: Behavior normal.         Thought Content: Thought content normal.         Judgment: Judgment normal.       Assessment/Plan   1. Type 2 diabetes mellitus without complication, unspecified whether long term insulin use (Multi)    - POCT Glycosylated Hemoglobin (HGB A1C) docked device  - glimepiride (Amaryl) 2 mg tablet; Take 1 tablet (2 mg) by mouth once daily.  Dispense: 90 tablet; Refill: 1  - atorvastatin (Lipitor) 80 mg tablet; Take 1 tablet (80 mg) by mouth once daily.  Dispense: 90 tablet; Refill: 1    2. Allergy, initial encounter    - montelukast (Singulair) 10 mg tablet; Take 1 tablet (10 mg) by mouth once daily.  Dispense: 90 tablet; Refill: 3    3. Type 2 diabetes mellitus without complication, without long-term current use of insulin (Multi)  Dm well controlled  - pioglitazone (Actos) 15 mg tablet; Take 1 tablet (15 mg)  by mouth once daily.  Dispense: 90 tablet; Refill: 3    4. Routine general medical examination at a health care facility    - Hemoglobin A1C  - Lipid Panel  - Prostate Specific Antigen, Screen  - TSH with reflex to Free T4 if abnormal    5. Diabetic polyneuropathy associated with type 2 diabetes mellitus (Multi)  - has neuropahty on lyrica refill givne oarrs reviewed, uds   - [id number]-algal oil (Metanx, algal oil,) 3 mg-35 mg-2 mg -90.314 mg capsule; Take 1 capsule by mouth once daily.  Dispense: 90 capsule; Refill: 3    6. Lumbar radiculopathy  Ct revwied, has sever stenosis in low back  Recommend pain management to see if injections help  - Referral to Pain Medicine; Future      8. Controlled substance agreement signed    - Drug Screen, Urine With Reflex to Confirmation    Tobacco/Alcohol/Opioid use, as well as Illicit Drug Use was screened for/reviewed and documented in Social Documentation section of the chart and medication list as appropriate    Depression Screening  Depression screening completed using the PHQ-2 questions, with results documented in the chart/encounter (~15min).  (See Rooming Screening section for documentation, or progress note for additional information)    Cardiac Risk Assessment  The ASCVD Risk score (Ginna DK, et al., 2019) failed to calculate for the following reasons:    The valid total cholesterol range is 130 to 320 mg/dL  Cardiovascular risk was discussed and, if needed, lifestyle modifications recommended, including nutritional choices, exercise, and elimination of habits contributing to risk. We agreed on a plan to reduce the current cardiovascular risk based on above discussion as needed.     Aspirin use/disuse was discussed and documented in the Problem List of the medical record (under Cardiac Risk Counseling) after reviewing the updated guidelines below:  Consider low dose Aspirin ( mg) use if the benefit for cardiovascular disease prevention outweighs risk  for bleeding complications.   In general, low dose ASA should be considered:  In patients WITHOUT prior MI/stroke/PAD (primary prevention):   a. Age <60: Use if 10-year cardiovascular disease risk >20%, with discussion of risks and benefits with patient  b. Age 60-<70: Use if 10-year cardiovascular disease risk >20% and low bleeding (e.g., gastrointestinal) risk, with discussion of risks and benefits with patient  c. Age >=70: Do not use    In patients WITH prior MI/stroke/PAD (secondary prevention):   Generally use unless extremely high bleeding (e.g., gastrointenstinal) risk, with discussion of risks and benefits with patient    Advance Directives Discussion  Advanced Care Planning (including a Living Will, Healthcare POA, as well as specific end of life choices and/or directives), was discussed with the patient and/or surrogate, voluntarily, and details of that discussion documented in the Problem List (under Advanced Directives Discussion) of the medical record.    (~16 min spent discussing above)     During the course of the visit the patient was educated and counseled about age appropriate screening and preventive services.   Completed preventive screenings were documented in the chart (see Routine Health Maintenance in Problem List) and orders were placed for outstanding screenings/procedures as documented in the Assessment and Plan.

## 2024-09-30 ENCOUNTER — APPOINTMENT (OUTPATIENT)
Dept: PAIN MEDICINE | Facility: CLINIC | Age: 74
End: 2024-09-30
Payer: MEDICARE

## 2024-10-06 NOTE — PROGRESS NOTES
Subjective   Patient ID: Andi Jovel is a 74 y.o. male who presents for No chief complaint on file..   HPI  PT PRESENTS FOR EVALUATION OF GROSS HEMATURIA PRESENT FOR APPROX 3-4 WEEK.  Pt denies any fever, chills, lfank or abd pain.   Are you experiencing:  Burning on urination -- A LITTLE   Pain on urination  -- NO  Urinary frequency - 4 X / DAY  Urinary urgency -- YES HERB AT NIGHT   Urge incontinence --RARE  Urinary stress incontinence  -- NO  Number of pads used per day --NO  Eneuresis -- NO  Nocturia-- 2 X ON AVG  Hematuria -- YES  Hesitancy -- NO  Post void fullness --  NO     Review of Systems  General-- No C/O fever or chills  Head-- No C/O Dizziness  Eyes-- NO  C/O blurry or double vision  Ears-- No C/O hearing loss  Neck-- Supple  Chest-- No C/O pain or discomfort  Lungs-- No C/O shortness of breath  Abdomen-- No C/O  pain or discomfort, No nausea or vomiting  Back-- PT C/O back pain AND discomfort SECONDARY TO L-S DDD  Extremities-- No C/O swelling or pain    Objective   Physical Exam    General-- well developed, well nourished in NAD  Head-- normal cephalic, atraumatic  Eyes-- PERRL, EOM'S FROM,  no  jaundice  Neck-- Supple, without masses  Chest-- Normal bony structure  Abdomen-- soft, non tender, liver spleen not palpable . No supra pubic masses  Back-- no flank masses palpable, no CVA tenderness on palpation or perc;ussion  Lymph nodes-- No inguinal lymphadenopathy noted  Prostate-- 2+, firm, smooth, non tender,without nodules  Testis-- both down, non tender, without masses  Epididymis-- no masses palpable  Scrotum -- no hydrocele noted  Extremities -- Normal muscle mass and tone for the patients age  Neurological-- oriented times three    SMOKE -- 1/2 PACK PER DAY    PSA;  8-27-24 -- 3.40    7-27-24  CAT SCAN OF THE ABD AND PELVIS: DONE WITHOUT IV CONTRAST  IMPRESSION:  Chest  1.  Nondisplaced fracture lateral right 9th rib  2. No fractures are noted in the thoracic vertebra.  3. 5 mm  pleural-based nodule posterior right apex requires no  specific follow-up.      Abdomen-Pelvis  1.  No fractures noted in the lumbar vertebra  2. No solid organ injury is identified  3. Multiple bilateral renal cysts  4. Bilateral punctate nonobstructive renal stones    URINALYSIS DIPSTICK-- 2+ PROTEIN, LARGE HEMOGLOBINURIA, SMALL BILIRUBIN    PSA:  8-27-24-- 3.40  Assessment/Plan   A:  GROSS HEMATURIA  PATHOPHYSIOLOGY OF THE ABOVE AND OPTIONS OF FURTHER EVALUATION DISCUSSED IN DETAIL WITH THE PT AND HIS WIFE  ALL QUESTIONS ANSWERED    SON HAS PROSTATE CANCER  P:  SCHEDULE:  CYSTO, RETROGRADE PYELOGRAM, OUT PT, GEN ANES -- SCHED IN TWO WEEKS  BECAUSE HIS WIFE IS TO HAVE SURGERY ON 10-15-24    Sudheer Matthew MD 10/06/24 1:11 PM

## 2024-10-07 ENCOUNTER — OFFICE VISIT (OUTPATIENT)
Dept: UROLOGY | Facility: CLINIC | Age: 74
End: 2024-10-07
Payer: MEDICARE

## 2024-10-07 VITALS
SYSTOLIC BLOOD PRESSURE: 103 MMHG | WEIGHT: 215 LBS | HEIGHT: 75 IN | RESPIRATION RATE: 16 BRPM | DIASTOLIC BLOOD PRESSURE: 83 MMHG | BODY MASS INDEX: 26.73 KG/M2 | TEMPERATURE: 98.7 F | HEART RATE: 98 BPM

## 2024-10-07 DIAGNOSIS — R31.9 HEMATURIA, UNSPECIFIED TYPE: ICD-10-CM

## 2024-10-07 DIAGNOSIS — R39.15 URGENCY OF MICTURITION: Primary | ICD-10-CM

## 2024-10-07 DIAGNOSIS — R31.0 GROSS HEMATURIA: ICD-10-CM

## 2024-10-07 DIAGNOSIS — R35.1 NOCTURIA: ICD-10-CM

## 2024-10-07 DIAGNOSIS — N39.41 URGENCY INCONTINENCE: ICD-10-CM

## 2024-10-07 DIAGNOSIS — R35.0 FREQUENCY OF MICTURITION: ICD-10-CM

## 2024-10-07 LAB
POC APPEARANCE, URINE: CLEAR
POC BILIRUBIN, URINE: ABNORMAL
POC BLOOD, URINE: ABNORMAL
POC COLOR, URINE: ABNORMAL
POC GLUCOSE, URINE: NEGATIVE MG/DL
POC KETONES, URINE: NEGATIVE MG/DL
POC LEUKOCYTES, URINE: NEGATIVE
POC NITRITE,URINE: NEGATIVE
POC PH, URINE: 5.5 PH
POC PROTEIN, URINE: ABNORMAL MG/DL
POC SPECIFIC GRAVITY, URINE: 1.02
POC UROBILINOGEN, URINE: 0.2 EU/DL

## 2024-10-07 PROCEDURE — 3079F DIAST BP 80-89 MM HG: CPT | Performed by: UROLOGY

## 2024-10-07 PROCEDURE — 3044F HG A1C LEVEL LT 7.0%: CPT | Performed by: UROLOGY

## 2024-10-07 PROCEDURE — 1160F RVW MEDS BY RX/DR IN RCRD: CPT | Performed by: UROLOGY

## 2024-10-07 PROCEDURE — 99214 OFFICE O/P EST MOD 30 MIN: CPT | Performed by: UROLOGY

## 2024-10-07 PROCEDURE — 3074F SYST BP LT 130 MM HG: CPT | Performed by: UROLOGY

## 2024-10-07 PROCEDURE — 1123F ACP DISCUSS/DSCN MKR DOCD: CPT | Performed by: UROLOGY

## 2024-10-07 PROCEDURE — 81003 URINALYSIS AUTO W/O SCOPE: CPT | Mod: QW | Performed by: UROLOGY

## 2024-10-07 PROCEDURE — 1126F AMNT PAIN NOTED NONE PRSNT: CPT | Performed by: UROLOGY

## 2024-10-07 PROCEDURE — 1159F MED LIST DOCD IN RCRD: CPT | Performed by: UROLOGY

## 2024-10-07 PROCEDURE — 3048F LDL-C <100 MG/DL: CPT | Performed by: UROLOGY

## 2024-10-07 RX ORDER — PENICILLIN V POTASSIUM 500 MG/1
TABLET, FILM COATED ORAL
COMMUNITY
Start: 2024-05-07

## 2024-10-07 RX ORDER — HYDROCODONE BITARTRATE AND ACETAMINOPHEN 5; 325 MG/1; MG/1
1 TABLET ORAL EVERY 6 HOURS PRN
COMMUNITY
Start: 2024-05-07

## 2024-10-07 RX ORDER — METHYLPREDNISOLONE 4 MG/1
TABLET ORAL
COMMUNITY
Start: 2024-04-04 | End: 2024-10-09 | Stop reason: ALTCHOICE

## 2024-10-07 RX ORDER — AMOXICILLIN 500 MG/1
CAPSULE ORAL
COMMUNITY
Start: 2023-12-16

## 2024-10-07 RX ORDER — IBUPROFEN 600 MG/1
1 TABLET ORAL EVERY 8 HOURS PRN
COMMUNITY
Start: 2024-05-07

## 2024-10-07 SDOH — ECONOMIC STABILITY: FOOD INSECURITY: WITHIN THE PAST 12 MONTHS, YOU WORRIED THAT YOUR FOOD WOULD RUN OUT BEFORE YOU GOT MONEY TO BUY MORE.: NEVER TRUE

## 2024-10-07 SDOH — ECONOMIC STABILITY: FOOD INSECURITY: WITHIN THE PAST 12 MONTHS, THE FOOD YOU BOUGHT JUST DIDN'T LAST AND YOU DIDN'T HAVE MONEY TO GET MORE.: NEVER TRUE

## 2024-10-07 ASSESSMENT — LIFESTYLE VARIABLES
HOW OFTEN DO YOU HAVE SIX OR MORE DRINKS ON ONE OCCASION: NEVER
AUDIT-C TOTAL SCORE: 1
SKIP TO QUESTIONS 9-10: 1
HOW MANY STANDARD DRINKS CONTAINING ALCOHOL DO YOU HAVE ON A TYPICAL DAY: 1 OR 2
HOW OFTEN DO YOU HAVE A DRINK CONTAINING ALCOHOL: MONTHLY OR LESS

## 2024-10-07 ASSESSMENT — ENCOUNTER SYMPTOMS
LOSS OF SENSATION IN FEET: 0
OCCASIONAL FEELINGS OF UNSTEADINESS: 0
DEPRESSION: 0

## 2024-10-07 ASSESSMENT — PATIENT HEALTH QUESTIONNAIRE - PHQ9
2. FEELING DOWN, DEPRESSED OR HOPELESS: NOT AT ALL
1. LITTLE INTEREST OR PLEASURE IN DOING THINGS: NOT AT ALL
SUM OF ALL RESPONSES TO PHQ9 QUESTIONS 1 AND 2: 0

## 2024-10-07 ASSESSMENT — COLUMBIA-SUICIDE SEVERITY RATING SCALE - C-SSRS
2. HAVE YOU ACTUALLY HAD ANY THOUGHTS OF KILLING YOURSELF?: NO
6. HAVE YOU EVER DONE ANYTHING, STARTED TO DO ANYTHING, OR PREPARED TO DO ANYTHING TO END YOUR LIFE?: NO
1. IN THE PAST MONTH, HAVE YOU WISHED YOU WERE DEAD OR WISHED YOU COULD GO TO SLEEP AND NOT WAKE UP?: NO

## 2024-10-07 ASSESSMENT — PAIN SCALES - GENERAL: PAINLEVEL: 0-NO PAIN

## 2024-10-07 NOTE — LETTER
October 8, 2024     Oni Ryo DO  [address] [facility] [address]    Patient: Cyrus Jovel   YOB: 1950   Date of Visit: 10/7/2024       Dear Dr. Oni Roy DO:    Thank you for referring Cyrus Jovel to me for evaluation. Below are my notes for this consultation.  If you have questions, please do not hesitate to call me. I look forward to following your patient along with you.       Sincerely,     Sudheer Matthew MD      CC: No Recipients  ______________________________________________________________________________________    Subjective  Patient ID: Andi Jovel is a 74 y.o. male who presents for No chief complaint on file..   HPI  PT PRESENTS FOR EVALUATION OF GROSS HEMATURIA PRESENT FOR APPROX 3-4 WEEK.  Pt denies any fever, chills, lfank or abd pain.   Are you experiencing:  Burning on urination -- A LITTLE   Pain on urination  -- NO  Urinary frequency - 4 X / DAY  Urinary urgency -- YES HERB AT NIGHT   Urge incontinence --RARE  Urinary stress incontinence  -- NO  Number of pads used per day --NO  Eneuresis -- NO  Nocturia-- 2 X ON AVG  Hematuria -- YES  Hesitancy -- NO  Post void fullness --  NO     Review of Systems  General-- No C/O fever or chills  Head-- No C/O Dizziness  Eyes-- NO  C/O blurry or double vision  Ears-- No C/O hearing loss  Neck-- Supple  Chest-- No C/O pain or discomfort  Lungs-- No C/O shortness of breath  Abdomen-- No C/O  pain or discomfort, No nausea or vomiting  Back-- PT C/O back pain AND discomfort SECONDARY TO L-S DDD  Extremities-- No C/O swelling or pain    Objective   Physical Exam    General-- well developed, well nourished in NAD  Head-- normal cephalic, atraumatic  Eyes-- PERRL, EOM'S FROM,  no  jaundice  Neck-- Supple, without masses  Chest-- Normal bony structure  Abdomen-- soft, non tender, liver spleen not palpable . No supra pubic masses  Back-- no flank masses palpable, no CVA tenderness on palpation or perc;ussion  Lymph nodes-- No inguinal  lymphadenopathy noted  Prostate-- 2+, firm, smooth, non tender,without nodules  Testis-- both down, non tender, without masses  Epididymis-- no masses palpable  Scrotum -- no hydrocele noted  Extremities -- Normal muscle mass and tone for the patients age  Neurological-- oriented times three    SMOKE -- 1/2 PACK PER DAY    PSA;  8-27-24 -- 3.40    7-27-24  CAT SCAN OF THE ABD AND PELVIS: DONE WITHOUT IV CONTRAST  IMPRESSION:  Chest  1.  Nondisplaced fracture lateral right 9th rib  2. No fractures are noted in the thoracic vertebra.  3. 5 mm pleural-based nodule posterior right apex requires no  specific follow-up.      Abdomen-Pelvis  1.  No fractures noted in the lumbar vertebra  2. No solid organ injury is identified  3. Multiple bilateral renal cysts  4. Bilateral punctate nonobstructive renal stones    URINALYSIS DIPSTICK-- 2+ PROTEIN, LARGE HEMOGLOBINURIA, SMALL BILIRUBIN    PSA:  8-27-24-- 3.40  Assessment/Plan   A:  GROSS HEMATURIA  PATHOPHYSIOLOGY OF THE ABOVE AND OPTIONS OF FURTHER EVALUATION DISCUSSED IN DETAIL WITH THE PT AND HIS WIFE  ALL QUESTIONS ANSWERED    SON HAS PROSTATE CANCER  P:  SCHEDULE:  CYSTO, RETROGRADE PYELOGRAM, OUT PT, GEN ANES -- SCHED IN TWO WEEKS  BECAUSE HIS WIFE IS TO HAVE SURGERY ON 10-15-24    Sudheer Matthew MD 10/06/24 1:11 PM

## 2024-10-08 ENCOUNTER — PREP FOR PROCEDURE (OUTPATIENT)
Dept: UROLOGY | Facility: CLINIC | Age: 74
End: 2024-10-08
Payer: MEDICARE

## 2024-10-08 DIAGNOSIS — R31.0 GROSS HEMATURIA: Primary | ICD-10-CM

## 2024-10-08 RX ORDER — CEFAZOLIN SODIUM 2 G/100ML
2 INJECTION, SOLUTION INTRAVENOUS ONCE
OUTPATIENT
Start: 2024-10-08 | End: 2024-10-08

## 2024-10-08 NOTE — H&P
History Of Present Illness  Cyrus Jovel is a 74 y.o. male presenting with  gross hematuria.     Past Medical History  He has no past medical history on file.    Surgical History  He has no past surgical history on file.     Social History  He reports that he has never smoked. He has never used smokeless tobacco. He reports current alcohol use. He reports that he does not use drugs.    Family History  No family history on file.     Allergies  Patient has no known allergies.    Review of Systems     Physical Exam     Last Recorded Vitals  There were no vitals taken for this visit.    Relevant Results    No results found for this or any previous visit (from the past 24 hour(s)).    Normal cat scan of the abd and pelvis done without iv contrast     Assessment/Plan   A:  H/o gross hematuria  P;  Cysto, bilateral retrogrades, out pt, gen anes        I spent  minutes in the professional and overall care of this patient.      Sudheer Matthew MD

## 2024-10-09 ENCOUNTER — OFFICE VISIT (OUTPATIENT)
Dept: PAIN MEDICINE | Facility: CLINIC | Age: 74
End: 2024-10-09
Payer: MEDICARE

## 2024-10-09 VITALS
OXYGEN SATURATION: 96 % | DIASTOLIC BLOOD PRESSURE: 56 MMHG | RESPIRATION RATE: 15 BRPM | WEIGHT: 227 LBS | HEART RATE: 91 BPM | HEIGHT: 75 IN | SYSTOLIC BLOOD PRESSURE: 119 MMHG | TEMPERATURE: 96.6 F | BODY MASS INDEX: 28.23 KG/M2

## 2024-10-09 DIAGNOSIS — M48.062 SPINAL STENOSIS OF LUMBAR REGION WITH NEUROGENIC CLAUDICATION: ICD-10-CM

## 2024-10-09 DIAGNOSIS — M51.26 LUMBAR DISC HERNIATION: ICD-10-CM

## 2024-10-09 DIAGNOSIS — G89.29 CHRONIC BILATERAL LOW BACK PAIN WITH BILATERAL SCIATICA: ICD-10-CM

## 2024-10-09 DIAGNOSIS — M54.41 CHRONIC BILATERAL LOW BACK PAIN WITH BILATERAL SCIATICA: ICD-10-CM

## 2024-10-09 DIAGNOSIS — M54.16 LUMBAR RADICULOPATHY: Primary | ICD-10-CM

## 2024-10-09 DIAGNOSIS — M54.42 CHRONIC BILATERAL LOW BACK PAIN WITH BILATERAL SCIATICA: ICD-10-CM

## 2024-10-09 PROCEDURE — 99214 OFFICE O/P EST MOD 30 MIN: CPT | Performed by: ANESTHESIOLOGY

## 2024-10-09 ASSESSMENT — PATIENT HEALTH QUESTIONNAIRE - PHQ9
2. FEELING DOWN, DEPRESSED OR HOPELESS: NOT AT ALL
SUM OF ALL RESPONSES TO PHQ9 QUESTIONS 1 AND 2: 0
1. LITTLE INTEREST OR PLEASURE IN DOING THINGS: NOT AT ALL

## 2024-10-09 ASSESSMENT — PAIN - FUNCTIONAL ASSESSMENT: PAIN_FUNCTIONAL_ASSESSMENT: 0-10

## 2024-10-09 ASSESSMENT — PAIN DESCRIPTION - DESCRIPTORS: DESCRIPTORS: BURNING;HEAVINESS;SHARP

## 2024-10-09 ASSESSMENT — PAIN SCALES - GENERAL
PAINLEVEL: 7
PAINLEVEL_OUTOF10: 7

## 2024-10-09 ASSESSMENT — ENCOUNTER SYMPTOMS
LOSS OF SENSATION IN FEET: 1
OCCASIONAL FEELINGS OF UNSTEADINESS: 0

## 2024-10-09 NOTE — H&P (VIEW-ONLY)
"History Of Present Illness  Andi Jovel \"Chase" is a 74 y.o. male presenting with   Chief Complaint   Patient presents with    Pain       Patient presents with complaints of chronic low back pain to the B/L LE with N/T in bilateral LE. The pain is constant, worse with activity and better with rest. The pain is sharp, stabbing and shooting to the B/L LE. Denies LE paresthesias, weakness, saddle anesthesia, bowel or bladder incontinence. To manage this pain the patient has attempted Cyclobenzaprine, ASA with some relief.  The patients chronic DM, DLD, GERD, BPH are stable on medication management.       PAIN SCORE: 7/10.    PCP: Dr. Roy        Past Medical History  He has a past medical history of Diabetes 1.5, managed as type 2 (Multi) and Hyperlipidemia.    Surgical History  He has a past surgical history that includes XR knee.     Social History  He reports that he has been smoking cigarettes. He has never used smokeless tobacco. He reports current alcohol use. He reports that he does not use drugs.    Family History  No family history on file.     Allergies  Patient has no known allergies.    Review of Systems    All other systems reviewed and negative for any deficits. Pertinent positives and negatives were considered in the medical decision making process.        Physical Exam  /56   Pulse 91   Temp 35.9 °C (96.6 °F)   Resp 15   Wt 103 kg (227 lb)   SpO2 96%     General: Pt appears stated age    Eyes: Conjunctiva non-icteric and lids without obvious rash or drooping. Pupils are symmetric.    ENT: External ears and nose appear to be without deformity or rash. No lesions or masses noted. Hearing is grossly intact.    Respiratory: No gasping or shortness of breath noted. No use of accessory muscles noted.    CVS: Extremities show no edema or varicosities    Skin: No rashes or open lesions/ulcers identified on skin. No induration/tightening noted with palpation of skin.     Musculoskeletal: [place] is " grossly normal.    Stability: No subluxation noted on movement of bilateral upper extremities or head/neck.     Strength: 4/5 in RLE and 4/5 in LLE     Range of Motion: WNL    Neurologic: Reflexes 2+    Sensation: DEC TO SHARP TOUCH IN RLE ALONG     Neurologic: Cranial Nerves II thru XII are grossly intact    Psychiatric: Pt is alert and oriented to time, person, and place       Assessment/Plan   1. Spinal stenosis of lumbar region with neurogenic claudication        2. Lumbar radiculopathy  Referral to Pain Medicine      3. Lumbar disc herniation        4. Chronic bilateral low back pain with bilateral sciatica          I have provided the patient with a list of physical therapy exercises to learn and perform to strengthen core, maintain stabilization, and reduce pain. We reviewed the exercises in detail and I encouraged them to perform them on a regular basis.  I would recommend the pt continue ON LYRICA to help with nerve related pain. We discussed the risks, benefits, and side effects to this medication including the mechanism of action and the pt understands and agrees.  I extensively reviewed the patients CT Scan findings in detail, including review of the actual images and provided a detailed explanation of the findings using a spine model. There is noted significant disc degeneration at the L4/5 and L5/S1 levels with canal stenosis at the L4/5 level.   The patient is a candidate for an LESI L5/S1 to treat low back and radicular pain. I spent time with the patient discussing the risks, benefits, and alternatives to this measure including, but not limited to worsening pain with injection, no improvement/guarantee with the procedure, numbness in the lower extremity and risk of falls post procedure, vagal reaction/ hypotension, feeling dizzy / lightheaded, spinal infection, worsening pre-existing infections, epidural hematoma, epidural abscess, nerve injury, paralysis, spinal fluid leak and spinal headache. The  patient understands all of these risks and agrees to proceed with the planned procedure.          Buster Howard MD    I spent time with the patient reviewing their imaging and discussing the risks benefits and alternatives to the above plan. A total of 45 minutes was spent reviewing the data and greater than 50% of that time was with the patient during the face to face encounter discussing treatment options both surgical, non-surgical, and minimally invasive techniques.

## 2024-10-09 NOTE — PROGRESS NOTES
"History Of Present Illness  Andi Jovel \"Chase" is a 74 y.o. male presenting with   Chief Complaint   Patient presents with    Pain       Patient presents with complaints of chronic low back pain to the B/L LE with N/T in bilateral LE. The pain is constant, worse with activity and better with rest. The pain is sharp, stabbing and shooting to the B/L LE. Denies LE paresthesias, weakness, saddle anesthesia, bowel or bladder incontinence. To manage this pain the patient has attempted Cyclobenzaprine, ASA with some relief.  The patients chronic DM, DLD, GERD, BPH are stable on medication management.       PAIN SCORE: 7/10.    PCP: Dr. Roy        Past Medical History  He has a past medical history of Diabetes 1.5, managed as type 2 (Multi) and Hyperlipidemia.    Surgical History  He has a past surgical history that includes XR knee.     Social History  He reports that he has been smoking cigarettes. He has never used smokeless tobacco. He reports current alcohol use. He reports that he does not use drugs.    Family History  No family history on file.     Allergies  Patient has no known allergies.    Review of Systems    All other systems reviewed and negative for any deficits. Pertinent positives and negatives were considered in the medical decision making process.        Physical Exam  /56   Pulse 91   Temp 35.9 °C (96.6 °F)   Resp 15   Wt 103 kg (227 lb)   SpO2 96%     General: Pt appears stated age    Eyes: Conjunctiva non-icteric and lids without obvious rash or drooping. Pupils are symmetric.    ENT: External ears and nose appear to be without deformity or rash. No lesions or masses noted. Hearing is grossly intact.    Respiratory: No gasping or shortness of breath noted. No use of accessory muscles noted.    CVS: Extremities show no edema or varicosities    Skin: No rashes or open lesions/ulcers identified on skin. No induration/tightening noted with palpation of skin.     Musculoskeletal: [place] is " grossly normal.    Stability: No subluxation noted on movement of bilateral upper extremities or head/neck.     Strength: 4/5 in RLE and 4/5 in LLE     Range of Motion: WNL    Neurologic: Reflexes 2+    Sensation: DEC TO SHARP TOUCH IN RLE ALONG     Neurologic: Cranial Nerves II thru XII are grossly intact    Psychiatric: Pt is alert and oriented to time, person, and place       Assessment/Plan   1. Spinal stenosis of lumbar region with neurogenic claudication        2. Lumbar radiculopathy  Referral to Pain Medicine      3. Lumbar disc herniation        4. Chronic bilateral low back pain with bilateral sciatica          I have provided the patient with a list of physical therapy exercises to learn and perform to strengthen core, maintain stabilization, and reduce pain. We reviewed the exercises in detail and I encouraged them to perform them on a regular basis.  I would recommend the pt continue ON LYRICA to help with nerve related pain. We discussed the risks, benefits, and side effects to this medication including the mechanism of action and the pt understands and agrees.  I extensively reviewed the patients CT Scan findings in detail, including review of the actual images and provided a detailed explanation of the findings using a spine model. There is noted significant disc degeneration at the L4/5 and L5/S1 levels with canal stenosis at the L4/5 level.   The patient is a candidate for an LESI L5/S1 to treat low back and radicular pain. I spent time with the patient discussing the risks, benefits, and alternatives to this measure including, but not limited to worsening pain with injection, no improvement/guarantee with the procedure, numbness in the lower extremity and risk of falls post procedure, vagal reaction/ hypotension, feeling dizzy / lightheaded, spinal infection, worsening pre-existing infections, epidural hematoma, epidural abscess, nerve injury, paralysis, spinal fluid leak and spinal headache. The  patient understands all of these risks and agrees to proceed with the planned procedure.          Buster Howard MD    I spent time with the patient reviewing their imaging and discussing the risks benefits and alternatives to the above plan. A total of 45 minutes was spent reviewing the data and greater than 50% of that time was with the patient during the face to face encounter discussing treatment options both surgical, non-surgical, and minimally invasive techniques.

## 2024-10-17 ENCOUNTER — PRE-ADMISSION TESTING (OUTPATIENT)
Dept: PREADMISSION TESTING | Facility: HOSPITAL | Age: 74
End: 2024-10-17
Payer: MEDICARE

## 2024-10-17 VITALS
TEMPERATURE: 95 F | WEIGHT: 216.05 LBS | OXYGEN SATURATION: 98 % | DIASTOLIC BLOOD PRESSURE: 57 MMHG | SYSTOLIC BLOOD PRESSURE: 125 MMHG | RESPIRATION RATE: 18 BRPM | HEART RATE: 84 BPM | HEIGHT: 75 IN | BODY MASS INDEX: 26.86 KG/M2

## 2024-10-17 DIAGNOSIS — Z01.818 ENCOUNTER FOR PREADMISSION TESTING: Primary | ICD-10-CM

## 2024-10-17 DIAGNOSIS — R31.0 GROSS HEMATURIA: ICD-10-CM

## 2024-10-17 LAB
ANION GAP SERPL CALC-SCNC: 10 MMOL/L (ref 10–20)
APPEARANCE UR: ABNORMAL
BASOPHILS # BLD AUTO: 0.05 X10*3/UL (ref 0–0.1)
BASOPHILS NFR BLD AUTO: 1 %
BILIRUB UR STRIP.AUTO-MCNC: NEGATIVE MG/DL
BUN SERPL-MCNC: 18 MG/DL (ref 6–23)
CALCIUM SERPL-MCNC: 9.3 MG/DL (ref 8.6–10.3)
CHLORIDE SERPL-SCNC: 108 MMOL/L (ref 98–107)
CO2 SERPL-SCNC: 27 MMOL/L (ref 21–32)
COLOR UR: ABNORMAL
CREAT SERPL-MCNC: 1.53 MG/DL (ref 0.5–1.3)
EGFRCR SERPLBLD CKD-EPI 2021: 47 ML/MIN/1.73M*2
EOSINOPHIL # BLD AUTO: 0.16 X10*3/UL (ref 0–0.4)
EOSINOPHIL NFR BLD AUTO: 3.3 %
ERYTHROCYTE [DISTWIDTH] IN BLOOD BY AUTOMATED COUNT: 13 % (ref 11.5–14.5)
GLUCOSE SERPL-MCNC: 160 MG/DL (ref 74–99)
GLUCOSE UR STRIP.AUTO-MCNC: NORMAL MG/DL
HCT VFR BLD AUTO: 37.7 % (ref 41–52)
HGB BLD-MCNC: 12.2 G/DL (ref 13.5–17.5)
IMM GRANULOCYTES # BLD AUTO: 0.02 X10*3/UL (ref 0–0.5)
IMM GRANULOCYTES NFR BLD AUTO: 0.4 % (ref 0–0.9)
KETONES UR STRIP.AUTO-MCNC: NEGATIVE MG/DL
LEUKOCYTE ESTERASE UR QL STRIP.AUTO: ABNORMAL
LYMPHOCYTES # BLD AUTO: 1.74 X10*3/UL (ref 0.8–3)
LYMPHOCYTES NFR BLD AUTO: 35.5 %
MCH RBC QN AUTO: 29.5 PG (ref 26–34)
MCHC RBC AUTO-ENTMCNC: 32.4 G/DL (ref 32–36)
MCV RBC AUTO: 91 FL (ref 80–100)
MONOCYTES # BLD AUTO: 0.32 X10*3/UL (ref 0.05–0.8)
MONOCYTES NFR BLD AUTO: 6.5 %
NEUTROPHILS # BLD AUTO: 2.61 X10*3/UL (ref 1.6–5.5)
NEUTROPHILS NFR BLD AUTO: 53.3 %
NITRITE UR QL STRIP.AUTO: NEGATIVE
NRBC BLD-RTO: 0 /100 WBCS (ref 0–0)
PH UR STRIP.AUTO: 6 [PH]
PLATELET # BLD AUTO: 201 X10*3/UL (ref 150–450)
POTASSIUM SERPL-SCNC: 4.4 MMOL/L (ref 3.5–5.3)
PROT UR STRIP.AUTO-MCNC: ABNORMAL MG/DL
RBC # BLD AUTO: 4.14 X10*6/UL (ref 4.5–5.9)
RBC # UR STRIP.AUTO: ABNORMAL /UL
RBC #/AREA URNS AUTO: >20 /HPF
SODIUM SERPL-SCNC: 141 MMOL/L (ref 136–145)
SP GR UR STRIP.AUTO: 1.01
UROBILINOGEN UR STRIP.AUTO-MCNC: NORMAL MG/DL
WBC # BLD AUTO: 4.9 X10*3/UL (ref 4.4–11.3)
WBC #/AREA URNS AUTO: ABNORMAL /HPF

## 2024-10-17 PROCEDURE — 36415 COLL VENOUS BLD VENIPUNCTURE: CPT

## 2024-10-17 PROCEDURE — 81001 URINALYSIS AUTO W/SCOPE: CPT

## 2024-10-17 PROCEDURE — 87086 URINE CULTURE/COLONY COUNT: CPT | Mod: PARLAB

## 2024-10-17 PROCEDURE — 85025 COMPLETE CBC W/AUTO DIFF WBC: CPT

## 2024-10-17 PROCEDURE — 80048 BASIC METABOLIC PNL TOTAL CA: CPT

## 2024-10-17 PROCEDURE — 99204 OFFICE O/P NEW MOD 45 MIN: CPT | Performed by: NURSE PRACTITIONER

## 2024-10-17 ASSESSMENT — DUKE ACTIVITY SCORE INDEX (DASI)
CAN YOU WALK A BLOCK OR TWO ON LEVEL GROUND: NO
CAN YOU DO HEAVY WORK AROUND THE HOUSE LIKE SCRUBBING FLOORS OR LIFTING AND MOVING HEAVY FURNITURE: NO
CAN YOU TAKE CARE OF YOURSELF (EAT, DRESS, BATHE, OR USE TOILET): YES
CAN YOU RUN A SHORT DISTANCE: NO
CAN YOU HAVE SEXUAL RELATIONS: NO
CAN YOU CLIMB A FLIGHT OF STAIRS OR WALK UP A HILL: YES
CAN YOU DO YARD WORK LIKE RAKING LEAVES, WEEDING OR PUSHING A MOWER: YES
CAN YOU DO LIGHT WORK AROUND THE HOUSE LIKE DUSTING OR WASHING DISHES: YES
CAN YOU PARTICIPATE IN STRENOUS SPORTS LIKE SWIMMING, SINGLES TENNIS, FOOTBALL, BASKETBALL, OR SKIING: NO
CAN YOU WALK INDOORS, SUCH AS AROUND YOUR HOUSE: YES
CAN YOU DO MODERATE WORK AROUND THE HOUSE LIKE VACUUMING, SWEEPING FLOORS OR CARRYING GROCERIES: YES

## 2024-10-17 ASSESSMENT — PAIN - FUNCTIONAL ASSESSMENT: PAIN_FUNCTIONAL_ASSESSMENT: 0-10

## 2024-10-17 ASSESSMENT — PAIN SCALES - GENERAL: PAINLEVEL_OUTOF10: 2

## 2024-10-17 ASSESSMENT — PAIN DESCRIPTION - DESCRIPTORS: DESCRIPTORS: STABBING

## 2024-10-17 NOTE — CPM/PAT H&P
"CPM/PAT Evaluation       Name: Andi Jovel (Andi Jovel \"Cyrus\")  /Age: 1950/74 y.o.     In-Person       Chief Complaint: PAT for planned urology procedure    74 yr old male w/PHx of [place], tobacco use, HLD, DM II, chronic back pain r/t spinal stenosis, BPH w/LUTS and gross hematuria referred to PAT for planned Cystoscopy w/retrograde pyelogram w/Dr Matthew on 10/22/2024      Patient reports feeling overall well, denies fever, cough or recent infection. Accompanied by wife Celsa who is attentive and supportive.  Reports remaining otherwise mildly active d/t chronic back pain; denies cardiac or respiratory symptoms.  Denies past issues with anesthesia.      Followed by PCP (Ice) - last visit 2024  Followed by pain management (Anita) - last visit 10/9/2024         Past Medical History:   Diagnosis Date    Diabetes 1.5, managed as type 2 (Multi)     Hyperlipidemia    BPH w/LUTS  Kidney stone  Scrotal abscess  LLE staph infection  Chronic back pain  Spinal stenosis    Past Surgical History:   Procedure Laterality Date    KNEE     Left knee surgeries for staph infection after partial knee many years ago     Patient  has no history on file for sexual activity.    No family history on file.    No Known Allergies    Prior to Admission medications    Medication Sig Start Date End Date Taking? Authorizing Provider   amoxicillin (Amoxil) 500 mg capsule TAKE 4 CAPSULES BY MOUTH 1 HOUR PRIOR APPOINTMENT 23   Historical Provider, MD   atorvastatin (Lipitor) 80 mg tablet Take 1 tablet (80 mg) by mouth once daily. 24   Oni Roy, DO   cetirizine (ZyrTEC) 10 mg tablet Take 1 tablet (10 mg) by mouth once daily.    Historical Provider, MD   cholecalciferol (Vitamin D3) 50 MCG (2000 UT) tablet Take 1 tablet (50 mcg) by mouth once daily.    Historical Provider, MD   glimepiride (Amaryl) 2 mg tablet Take 1 tablet (2 mg) by mouth once daily. 24   Oni Roy, DO   HYDROcodone-acetaminophen (Norco) 5-325 " mg tablet Take 1 tablet by mouth every 6 hours if needed for pain. 5/7/24   Historical Provider, MD   ibuprofen 600 mg tablet Take 1 tablet (600 mg) by mouth every 8 hours if needed for pain. 5/7/24   Historical Provider, MD   [id number]-algal oil (Metanx, algal oil,) 3 mg-35 mg-2 mg -90.314 mg capsule Take 1 capsule by mouth once daily. 8/27/24   Oni Ice, DO   montelukast (Singulair) 10 mg tablet Take 1 tablet (10 mg) by mouth once daily. 8/27/24   Oni Ice, DO   multivitamin tablet Take 1 tablet by mouth once daily.    Historical Provider, MD   omeprazole (PriLOSEC) 20 mg DR capsule Take 1 capsule (20 mg) by mouth once daily in the morning. Take before meals. 3/10/23   Historical Provider, MD   penicillin v potassium (Veetid) 500 mg tablet TAKE 1 TABLET EVERY 6 HOURS UNTIL GONE 5/7/24   Historical Provider, MD   pioglitazone (Actos) 15 mg tablet Take 1 tablet (15 mg) by mouth once daily. 8/27/24   Oni Ice, DO   pregabalin (Lyrica) 150 mg capsule TAKE 1 CAPSULE BY MOUTH TWICE A DAY 7/1/24   Oni Ice, DO   SITagliptin phosphate (Januvia) 50 mg tablet Take 1 tablet (50 mg) by mouth once daily. 4/15/24   Oni Ice, DO   tamsulosin (Flomax) 0.4 mg 24 hr capsule Take 1 capsule (0.4 mg) by mouth 2 times a day.    Historical Provider, MD        Review of Systems    Constitutional: no fever, no chills and not feeling poorly.   Eyes: no eyesight problems.   ENT: [place], no nosebleeds and no sore throat.   Cardiovascular: no chest pain, no palpitations and no extremity edema.   Respiratory: no sob, no wheezing, no cough and no sob w/exertion.   Gastrointestinal: negative for abdominal pain, blood in stools or changes in bowel habits   Genitourinary:  hx BPH, hematuria   Musculoskeletal: no arthralgias, ambulates independently.   Integumentary: negative for lesions, rash or itching.   Neurological: negative for confusion, dizziness or fainting.   Psychiatric: not suicidal, no anxiety and no depression.   All other  systems have been reviewed and are negative for complaint.     Physical Exam  Vitals reviewed.   HENT:      Head: Normocephalic.   Eyes:      Pupils: Pupils are equal, round, and reactive to light.   Cardiovascular:      Rate and Rhythm: Normal rate and regular rhythm.      Pulses: Normal pulses.   Pulmonary:      Effort: Pulmonary effort is normal.      Breath sounds: Normal breath sounds.   Abdominal:      General: Bowel sounds are normal.   Musculoskeletal:         General: Normal range of motion.      Cervical back: Normal range of motion.   Skin:     General: Skin is warm and dry.   Neurological:      Mental Status: He is alert and oriented to person, place, and time.   Psychiatric:         Mood and Affect: Mood normal.        PAT AIRWAY:   Airway:     Mallampati::  I    TM distance::  >3 FB    Neck ROM::  Limited   upper dentures and lower dentures      Visit Vitals  /57   Pulse 84   Temp 35 °C (95 °F) (Tympanic)   Resp 18       DASI Risk Score      Flowsheet Row Pre-Admission Testing [facility]17/[facility] in [facility]   Can you take care of yourself (eat, dress, bathe, or use toilet)?  2.75 filed at 10/17/2024 1107   Can you walk indoors, such as around your house? 1.75 filed at 10/17/2024 1107   Can you walk a block or two on level ground?  0 filed at 10/17/2024 1107   Can you climb a flight of stairs or walk up a hill? 5.5 filed at 10/17/2024 1107   Can you run a short distance? 0 filed at 10/17/2024 1107   Can you do light work around the house like dusting or washing dishes? 2.7 filed at 10/17/2024 1107   Can you do moderate work around the house like vacuuming, sweeping floors or carrying groceries? 3.5 filed at 10/17/2024 1107   Can you do heavy work around the house like scrubbing floors or lifting and moving heavy furniture?  0 filed at 10/17/2024 1107   Can you do yard work like raking leaves, weeding or pushing a mower? 4.5 filed at 10/17/2024 1107   Can you have sexual relations? 0  filed at 10/17/2024 1107   Can you participate in strenous sports like swimming, singles tennis, football, basketball, or skiing? 0 filed at 10/17/2024 1107          Caprini DVT Assessment    No data to display       Modified Frailty Index    No data to display       CHADS2 Stroke Risk         N/A 3 to 100%: High Risk   2 to < 3%: Medium Risk   0 to < 2%: Low Risk     Last Change: N/A          This score determines the patient's risk of having a stroke if the patient has atrial fibrillation.        This score is not applicable to this patient. Components are not calculated.          Revised Cardiac Risk Index    No data to display       Apfel Simplified Score    No data to display       Risk Analysis Index Results This Encounter    No data found in the last 10 encounters.       Stop Bang Score      Flowsheet Row Pre-Admission Testing from 10/17/2024 in [facility]   Do you snore loudly? 0 filed at 10/17/2024 1118   Do you often feel tired or fatigued after your sleep? 0 filed at 10/17/2024 1118   Has anyone ever observed you stop breathing in your sleep? 0 filed at 10/17/2024 1118   Do you have or are you being treated for high blood pressure? 0 filed at 10/17/2024 1118   Recent BMI (Calculated) 27 filed at 10/17/2024 1118   Is BMI greater than 35 kg/m2? 0=No filed at 10/17/2024 1118   Age older than 50 years old? 1=Yes filed at 10/17/2024 1118   Is your neck circumference greater than 17 inches (Male) or 16 inches (Female)? 0 filed at 10/17/2024 1118   Gender - Male 1=Yes filed at 10/17/2024 1118   STOP-BANG Total Score 2 filed at 10/17/2024 1118            Assessment and Plan:     # HLD - continue atorvastatin  # DM II - hold actos day of surgery, hold glimepiride and sitagliptin phosphate evening before and day of surgery; A1c 6.3 (8/27/2024)  # Daily tobacco use - smoking cessation discussed/encouraged - patient declined, adding not interested at this time (states started smoking when daughter  committed suicide over year ago)     # Chronic back pain - continue pregabalin, stop ibuprofen 7 days before surgery; followed by pain mgmt  # LUTS - continue flomax, followed by urology  # Gross hematuria - Cystoscopy w/retrograde pyelogram w/Dr Matthew on 10/22/2024    Ecg performed 7/27/2024 - Sinus merly, ventribular premature complex, prolonged MI interval, RBBB (57 bpm)  Medical hx, Allergies, VS and Labs reviewed  Medications addressed w/pre-op instructions provided

## 2024-10-17 NOTE — H&P (VIEW-ONLY)
"CPM/PAT Evaluation       Name: Andi Jovel (Andi Jovel \"Cyrus\")  /Age: 1950/74 y.o.     In-Person       Chief Complaint: PAT for planned urology procedure    74 yr old male w/PHx of [place], tobacco use, HLD, DM II, chronic back pain r/t spinal stenosis, BPH w/LUTS and gross hematuria referred to PAT for planned Cystoscopy w/retrograde pyelogram w/Dr Matthew on 10/22/2024      Patient reports feeling overall well, denies fever, cough or recent infection. Accompanied by wife Celsa who is attentive and supportive.  Reports remaining otherwise mildly active d/t chronic back pain; denies cardiac or respiratory symptoms.  Denies past issues with anesthesia.      Followed by PCP (Ice) - last visit 2024  Followed by pain management (Anita) - last visit 10/9/2024         Past Medical History:   Diagnosis Date    Diabetes 1.5, managed as type 2 (Multi)     Hyperlipidemia    BPH w/LUTS  Kidney stone  Scrotal abscess  LLE staph infection  Chronic back pain  Spinal stenosis    Past Surgical History:   Procedure Laterality Date    KNEE     Left knee surgeries for staph infection after partial knee many years ago     Patient  has no history on file for sexual activity.    No family history on file.    No Known Allergies    Prior to Admission medications    Medication Sig Start Date End Date Taking? Authorizing Provider   amoxicillin (Amoxil) 500 mg capsule TAKE 4 CAPSULES BY MOUTH 1 HOUR PRIOR APPOINTMENT 23   Historical Provider, MD   atorvastatin (Lipitor) 80 mg tablet Take 1 tablet (80 mg) by mouth once daily. 24   Oni Roy, DO   cetirizine (ZyrTEC) 10 mg tablet Take 1 tablet (10 mg) by mouth once daily.    Historical Provider, MD   cholecalciferol (Vitamin D3) 50 MCG (2000 UT) tablet Take 1 tablet (50 mcg) by mouth once daily.    Historical Provider, MD   glimepiride (Amaryl) 2 mg tablet Take 1 tablet (2 mg) by mouth once daily. 24   Oni Roy, DO   HYDROcodone-acetaminophen (Norco) 5-325 " mg tablet Take 1 tablet by mouth every 6 hours if needed for pain. 5/7/24   Historical Provider, MD   ibuprofen 600 mg tablet Take 1 tablet (600 mg) by mouth every 8 hours if needed for pain. 5/7/24   Historical Provider, MD   [id number]-algal oil (Metanx, algal oil,) 3 mg-35 mg-2 mg -90.314 mg capsule Take 1 capsule by mouth once daily. 8/27/24   Oni Ice, DO   montelukast (Singulair) 10 mg tablet Take 1 tablet (10 mg) by mouth once daily. 8/27/24   Oni Ice, DO   multivitamin tablet Take 1 tablet by mouth once daily.    Historical Provider, MD   omeprazole (PriLOSEC) 20 mg DR capsule Take 1 capsule (20 mg) by mouth once daily in the morning. Take before meals. 3/10/23   Historical Provider, MD   penicillin v potassium (Veetid) 500 mg tablet TAKE 1 TABLET EVERY 6 HOURS UNTIL GONE 5/7/24   Historical Provider, MD   pioglitazone (Actos) 15 mg tablet Take 1 tablet (15 mg) by mouth once daily. 8/27/24   Oni Ice, DO   pregabalin (Lyrica) 150 mg capsule TAKE 1 CAPSULE BY MOUTH TWICE A DAY 7/1/24   Oni Ice, DO   SITagliptin phosphate (Januvia) 50 mg tablet Take 1 tablet (50 mg) by mouth once daily. 4/15/24   Oni Ice, DO   tamsulosin (Flomax) 0.4 mg 24 hr capsule Take 1 capsule (0.4 mg) by mouth 2 times a day.    Historical Provider, MD        Review of Systems    Constitutional: no fever, no chills and not feeling poorly.   Eyes: no eyesight problems.   ENT: [place], no nosebleeds and no sore throat.   Cardiovascular: no chest pain, no palpitations and no extremity edema.   Respiratory: no sob, no wheezing, no cough and no sob w/exertion.   Gastrointestinal: negative for abdominal pain, blood in stools or changes in bowel habits   Genitourinary:  hx BPH, hematuria   Musculoskeletal: no arthralgias, ambulates independently.   Integumentary: negative for lesions, rash or itching.   Neurological: negative for confusion, dizziness or fainting.   Psychiatric: not suicidal, no anxiety and no depression.   All other  systems have been reviewed and are negative for complaint.     Physical Exam  Vitals reviewed.   HENT:      Head: Normocephalic.   Eyes:      Pupils: Pupils are equal, round, and reactive to light.   Cardiovascular:      Rate and Rhythm: Normal rate and regular rhythm.      Pulses: Normal pulses.   Pulmonary:      Effort: Pulmonary effort is normal.      Breath sounds: Normal breath sounds.   Abdominal:      General: Bowel sounds are normal.   Musculoskeletal:         General: Normal range of motion.      Cervical back: Normal range of motion.   Skin:     General: Skin is warm and dry.   Neurological:      Mental Status: He is alert and oriented to person, place, and time.   Psychiatric:         Mood and Affect: Mood normal.        PAT AIRWAY:   Airway:     Mallampati::  I    TM distance::  >3 FB    Neck ROM::  Limited   upper dentures and lower dentures      Visit Vitals  /57   Pulse 84   Temp 35 °C (95 °F) (Tympanic)   Resp 18       DASI Risk Score      Flowsheet Row Pre-Admission Testing from 10/17/2024 in [facility]   Can you take care of yourself (eat, dress, bathe, or use toilet)?  2.75 filed at 10/17/2024 1107   Can you walk indoors, such as around your house? 1.75 filed at 10/17/2024 1107   Can you walk a block or two on level ground?  0 filed at 10/17/2024 1107   Can you climb a flight of stairs or walk up a hill? 5.5 filed at 10/17/2024 1107   Can you run a short distance? 0 filed at 10/17/2024 1107   Can you do light work around the house like dusting or washing dishes? 2.7 filed at 10/17/2024 1107   Can you do moderate work around the house like vacuuming, sweeping floors or carrying groceries? 3.5 filed at 10/17/2024 1107   Can you do heavy work around the house like scrubbing floors or lifting and moving heavy furniture?  0 filed at 10/17/2024 1107   Can you do yard work like raking leaves, weeding or pushing a mower? 4.5 filed at 10/17/2024 1107   Can you have sexual relations? 0  filed at 10/17/2024 1107   Can you participate in strenous sports like swimming, singles tennis, football, basketball, or skiing? 0 filed at 10/17/2024 1107          Caprini DVT Assessment    No data to display       Modified Frailty Index    No data to display       CHADS2 Stroke Risk         N/A 3 to 100%: High Risk   2 to < 3%: Medium Risk   0 to < 2%: Low Risk     Last Change: N/A          This score determines the patient's risk of having a stroke if the patient has atrial fibrillation.        This score is not applicable to this patient. Components are not calculated.          Revised Cardiac Risk Index    No data to display       Apfel Simplified Score    No data to display       Risk Analysis Index Results This Encounter    No data found in the last 10 encounters.       Stop Bang Score      Flowsheet Row Pre-Admission Testing [facility] 10[facility]17/[facility]   Do you snore loudly? 0 filed at 10/17/2024 1118   Do you often feel tired or fatigued after your sleep? 0 filed at 10/17/2024 1118   Has anyone ever observed you stop breathing in your sleep? 0 filed at 10/17/2024 1118   Do you have or are you being treated for high blood pressure? 0 filed at 10/17/2024 1118   Recent BMI (Calculated) 27 filed at 10/17/2024 1118   Is BMI greater than 35 kg/m2? 0=No filed at 10/17/2024 1118   Age older than 50 years old? 1=Yes filed at 10/17/2024 1118   Is your neck circumference greater than 17 inches (Male) or 16 inches (Female)? 0 filed at 10/17/2024 1118   Gender - Male 1=Yes filed at 10/17/2024 1118   STOP-BANG Total Score 2 filed at 10/17/2024 1118            Assessment and Plan:     # HLD - continue atorvastatin  # DM II - hold actos day of surgery, hold glimepiride and sitagliptin phosphate evening before and day of surgery; A1c 6.3 (8/27/2024)  # Daily tobacco use - smoking cessation discussed/encouraged - patient declined, adding not interested at this time (states started smoking when daughter  committed suicide over year ago)     # Chronic back pain - continue pregabalin, stop ibuprofen 7 days before surgery; followed by pain mgmt  # LUTS - continue flomax, followed by urology  # Gross hematuria - Cystoscopy w/retrograde pyelogram w/Dr Matthew on 10/22/2024    Ecg performed 7/27/2024 - Sinus merly, ventribular premature complex, prolonged PA interval, RBBB (57 bpm)  Medical hx, Allergies, VS and Labs reviewed  Medications addressed w/pre-op instructions provided

## 2024-10-17 NOTE — PREPROCEDURE INSTRUCTIONS
Medication List            Accurate as of October 17, 2024 11:48 AM. Always use your most recent med list.                atorvastatin 80 mg tablet  Commonly known as: Lipitor  Take 1 tablet (80 mg) by mouth once daily.  Medication Adjustments for Surgery: Take/Use as prescribed     cetirizine 10 mg tablet  Commonly known as: ZyrTEC  Medication Adjustments for Surgery: Do Not take on the morning of surgery     glimepiride 2 mg tablet  Commonly known as: Amaryl  Take 1 tablet (2 mg) by mouth once daily.  Additional Medication Adjustments for Surgery: Other (Comment)  Notes to patient: Hold evening before and day of surgery     ibuprofen 600 mg tablet  Additional Medication Adjustments for Surgery: Take last dose 7 days before surgery     multivitamin tablet  Additional Medication Adjustments for Surgery: Take last dose 7 days before surgery     penicillin v potassium 500 mg tablet  Commonly known as: Veetid  Medication Adjustments for Surgery: Take/Use as prescribed     pioglitazone 15 mg tablet  Commonly known as: Actos  Take 1 tablet (15 mg) by mouth once daily.  Medication Adjustments for Surgery: Do Not take on the morning of surgery     pregabalin 150 mg capsule  Commonly known as: Lyrica  TAKE 1 CAPSULE BY MOUTH TWICE A DAY  Medication Adjustments for Surgery: Take/Use as prescribed     SITagliptin phosphate 50 mg tablet  Commonly known as: Januvia  Take 1 tablet (50 mg) by mouth once daily.  Additional Medication Adjustments for Surgery: Other (Comment)  Notes to patient: Hold evening before and day of surgery     tamsulosin 0.4 mg 24 hr capsule  Commonly known as: Flomax  Medication Adjustments for Surgery: Take/Use as prescribed                              NPO Instructions:    Do not eat any food after midnight the night before your surgery/procedure.    Additional Instructions:     Day of Surgery:  You may have clear liquids until TWO hours before surgery/procedure.  This includes water, black  tea/coffee, (no milk or cream) apple juice and electrolyte drinks (Gatorade)  Wear  comfortable loose fitting clothing  Do not use moisturizers, creams, lotions or perfume  All jewelry and valuables should be left at home        PRE-OPERATIVE INSTRUCTIONS FOR SURGERY    *Do not eat anything after midnight the night of surgery.  This includes food of any kind (including hard candy, cough drops, mints).   You may have up to 13 ounces of clear liquid  until TWO hours prior to your scheduled surgery time, unless ERAS* protocol is ordered for you.  Clear liquids include water, black tea/coffee, (no milk or cream) apple juice and electrolyte drinks (GATORADE).  You may chew gum until TWO hours prior you your surgery/procedure.     *ERAS protocol: follow as instructed.  DO not drink an additional 13 ounces as noted above.        *One of our staff members will call you ONE business day before your surgery, between 11am-2 pm to let you know the time to arrive.  If you have not received a call by 2 pm, call [phone]  *When you arrive at the hospital-->GO TO Registration on the ground floor  *Stop smoking 24 hours prior to surgery.  No Marijuana, CBD Oil or Vaping for 48 hours  *No alcohol 24 hours prior to surgery  *You will need a responsible adult to drive you home  -No acrylic nails or nail polish on at least one fingernail, NO polish on toes for foot surgery  -You may be asked to remove your dentures, partial plate, eyeglasses or contact lenses before going to surgery.  Please bring a case for these items.  -Body piercings need to be removed.  Jewelry and valuables should be left at home.  -Put on loose,  comfortable, clean clothing, that will accommodate bandages

## 2024-10-18 LAB — BACTERIA UR CULT: NO GROWTH

## 2024-10-22 ENCOUNTER — APPOINTMENT (OUTPATIENT)
Dept: PAIN MEDICINE | Facility: CLINIC | Age: 74
End: 2024-10-22
Payer: MEDICARE

## 2024-10-22 ENCOUNTER — ANESTHESIA (OUTPATIENT)
Dept: OPERATING ROOM | Facility: HOSPITAL | Age: 74
End: 2024-10-22
Payer: MEDICARE

## 2024-10-22 ENCOUNTER — PREP FOR PROCEDURE (OUTPATIENT)
Dept: PRIMARY CARE | Facility: CLINIC | Age: 74
End: 2024-10-22

## 2024-10-22 ENCOUNTER — HOSPITAL ENCOUNTER (OUTPATIENT)
Facility: HOSPITAL | Age: 74
Setting detail: OUTPATIENT SURGERY
Discharge: HOME | End: 2024-10-22
Attending: UROLOGY | Admitting: UROLOGY
Payer: MEDICARE

## 2024-10-22 ENCOUNTER — ANESTHESIA EVENT (OUTPATIENT)
Dept: OPERATING ROOM | Facility: HOSPITAL | Age: 74
End: 2024-10-22
Payer: MEDICARE

## 2024-10-22 ENCOUNTER — APPOINTMENT (OUTPATIENT)
Dept: RADIOLOGY | Facility: HOSPITAL | Age: 74
End: 2024-10-22
Payer: MEDICARE

## 2024-10-22 VITALS
TEMPERATURE: 97.2 F | SYSTOLIC BLOOD PRESSURE: 129 MMHG | HEIGHT: 75 IN | OXYGEN SATURATION: 98 % | DIASTOLIC BLOOD PRESSURE: 63 MMHG | BODY MASS INDEX: 26.86 KG/M2 | WEIGHT: 216.05 LBS | RESPIRATION RATE: 16 BRPM | HEART RATE: 59 BPM

## 2024-10-22 DIAGNOSIS — R31.0 GROSS HEMATURIA: Primary | ICD-10-CM

## 2024-10-22 PROBLEM — D64.9 ANEMIA: Status: ACTIVE | Noted: 2024-10-22

## 2024-10-22 PROBLEM — K21.9 GASTROESOPHAGEAL REFLUX DISEASE: Status: ACTIVE | Noted: 2024-10-22

## 2024-10-22 PROBLEM — J32.9 CHRONIC SINUSITIS: Status: ACTIVE | Noted: 2024-10-22

## 2024-10-22 LAB — GLUCOSE BLD MANUAL STRIP-MCNC: 133 MG/DL (ref 74–99)

## 2024-10-22 PROCEDURE — 3700000001 HC GENERAL ANESTHESIA TIME - INITIAL BASE CHARGE: Performed by: UROLOGY

## 2024-10-22 PROCEDURE — 7100000002 HC RECOVERY ROOM TIME - EACH INCREMENTAL 1 MINUTE: Performed by: UROLOGY

## 2024-10-22 PROCEDURE — A52234 PR CYSTOURETHROSCOPY,FULGUR 0.5-2 CM LESN: Performed by: ANESTHESIOLOGY

## 2024-10-22 PROCEDURE — 2550000001 HC RX 255 CONTRASTS: Performed by: UROLOGY

## 2024-10-22 PROCEDURE — 2500000004 HC RX 250 GENERAL PHARMACY W/ HCPCS (ALT 636 FOR OP/ED): Mod: JZ | Performed by: UROLOGY

## 2024-10-22 PROCEDURE — 88307 TISSUE EXAM BY PATHOLOGIST: CPT | Mod: TC,PARLAB | Performed by: UROLOGY

## 2024-10-22 PROCEDURE — 82947 ASSAY GLUCOSE BLOOD QUANT: CPT

## 2024-10-22 PROCEDURE — 3700000002 HC GENERAL ANESTHESIA TIME - EACH INCREMENTAL 1 MINUTE: Performed by: UROLOGY

## 2024-10-22 PROCEDURE — 2500000005 HC RX 250 GENERAL PHARMACY W/O HCPCS: Performed by: UROLOGY

## 2024-10-22 PROCEDURE — 76000 FLUOROSCOPY <1 HR PHYS/QHP: CPT | Mod: 59

## 2024-10-22 PROCEDURE — 7100000001 HC RECOVERY ROOM TIME - INITIAL BASE CHARGE: Performed by: UROLOGY

## 2024-10-22 PROCEDURE — 7100000009 HC PHASE TWO TIME - INITIAL BASE CHARGE: Performed by: UROLOGY

## 2024-10-22 PROCEDURE — 2720000007 HC OR 272 NO HCPCS: Performed by: UROLOGY

## 2024-10-22 PROCEDURE — 52234 CYSTOSCOPY AND TREATMENT: CPT | Performed by: UROLOGY

## 2024-10-22 PROCEDURE — 7100000010 HC PHASE TWO TIME - EACH INCREMENTAL 1 MINUTE: Performed by: UROLOGY

## 2024-10-22 PROCEDURE — 3600000008 HC OR TIME - EACH INCREMENTAL 1 MINUTE - PROCEDURE LEVEL THREE: Performed by: UROLOGY

## 2024-10-22 PROCEDURE — 2500000001 HC RX 250 WO HCPCS SELF ADMINISTERED DRUGS (ALT 637 FOR MEDICARE OP): Performed by: ANESTHESIOLOGY

## 2024-10-22 PROCEDURE — 2500000004 HC RX 250 GENERAL PHARMACY W/ HCPCS (ALT 636 FOR OP/ED): Performed by: ANESTHESIOLOGIST ASSISTANT

## 2024-10-22 PROCEDURE — A52234 PR CYSTOURETHROSCOPY,FULGUR 0.5-2 CM LESN: Performed by: ANESTHESIOLOGIST ASSISTANT

## 2024-10-22 PROCEDURE — 99100 ANES PT EXTEME AGE<1 YR&>70: CPT | Performed by: ANESTHESIOLOGY

## 2024-10-22 PROCEDURE — 3600000003 HC OR TIME - INITIAL BASE CHARGE - PROCEDURE LEVEL THREE: Performed by: UROLOGY

## 2024-10-22 RX ORDER — CEFAZOLIN SODIUM 2 G/100ML
2 INJECTION, SOLUTION INTRAVENOUS ONCE
Status: COMPLETED | OUTPATIENT
Start: 2024-10-22 | End: 2024-10-22

## 2024-10-22 RX ORDER — FENTANYL CITRATE 50 UG/ML
INJECTION, SOLUTION INTRAMUSCULAR; INTRAVENOUS AS NEEDED
Status: DISCONTINUED | OUTPATIENT
Start: 2024-10-22 | End: 2024-10-22

## 2024-10-22 RX ORDER — LIDOCAINE HYDROCHLORIDE 10 MG/ML
0.1 INJECTION, SOLUTION INFILTRATION; PERINEURAL ONCE
Status: DISCONTINUED | OUTPATIENT
Start: 2024-10-22 | End: 2024-10-22 | Stop reason: HOSPADM

## 2024-10-22 RX ORDER — ONDANSETRON HYDROCHLORIDE 2 MG/ML
4 INJECTION, SOLUTION INTRAVENOUS ONCE AS NEEDED
Status: DISCONTINUED | OUTPATIENT
Start: 2024-10-22 | End: 2024-10-22 | Stop reason: HOSPADM

## 2024-10-22 RX ORDER — SODIUM CHLORIDE 0.9 G/100ML
IRRIGANT IRRIGATION AS NEEDED
Status: DISCONTINUED | OUTPATIENT
Start: 2024-10-22 | End: 2024-10-22 | Stop reason: HOSPADM

## 2024-10-22 RX ORDER — GLYCOPYRROLATE 0.2 MG/ML
INJECTION INTRAMUSCULAR; INTRAVENOUS AS NEEDED
Status: DISCONTINUED | OUTPATIENT
Start: 2024-10-22 | End: 2024-10-22

## 2024-10-22 RX ORDER — ACETAMINOPHEN 325 MG/1
650 TABLET ORAL EVERY 4 HOURS PRN
Status: DISCONTINUED | OUTPATIENT
Start: 2024-10-22 | End: 2024-10-22 | Stop reason: HOSPADM

## 2024-10-22 RX ORDER — OXYCODONE AND ACETAMINOPHEN 5; 325 MG/1; MG/1
1 TABLET ORAL ONCE
Status: COMPLETED | OUTPATIENT
Start: 2024-10-22 | End: 2024-10-22

## 2024-10-22 RX ORDER — PROPOFOL 10 MG/ML
INJECTION, EMULSION INTRAVENOUS AS NEEDED
Status: DISCONTINUED | OUTPATIENT
Start: 2024-10-22 | End: 2024-10-22

## 2024-10-22 RX ORDER — ONDANSETRON HYDROCHLORIDE 2 MG/ML
INJECTION, SOLUTION INTRAVENOUS AS NEEDED
Status: DISCONTINUED | OUTPATIENT
Start: 2024-10-22 | End: 2024-10-22

## 2024-10-22 RX ORDER — ALBUTEROL SULFATE 0.83 MG/ML
2.5 SOLUTION RESPIRATORY (INHALATION) ONCE AS NEEDED
Status: DISCONTINUED | OUTPATIENT
Start: 2024-10-22 | End: 2024-10-22 | Stop reason: HOSPADM

## 2024-10-22 RX ORDER — PROCHLORPERAZINE EDISYLATE 5 MG/ML
5 INJECTION INTRAMUSCULAR; INTRAVENOUS ONCE AS NEEDED
Status: DISCONTINUED | OUTPATIENT
Start: 2024-10-22 | End: 2024-10-22 | Stop reason: HOSPADM

## 2024-10-22 RX ORDER — IPRATROPIUM BROMIDE 0.5 MG/2.5ML
500 SOLUTION RESPIRATORY (INHALATION) ONCE
Status: DISCONTINUED | OUTPATIENT
Start: 2024-10-22 | End: 2024-10-22 | Stop reason: HOSPADM

## 2024-10-22 RX ORDER — SODIUM CHLORIDE, SODIUM LACTATE, POTASSIUM CHLORIDE, CALCIUM CHLORIDE 600; 310; 30; 20 MG/100ML; MG/100ML; MG/100ML; MG/100ML
50 INJECTION, SOLUTION INTRAVENOUS CONTINUOUS
Status: ACTIVE | OUTPATIENT
Start: 2024-10-22 | End: 2024-10-22

## 2024-10-22 RX ORDER — MEPERIDINE HYDROCHLORIDE 25 MG/ML
12.5 INJECTION INTRAMUSCULAR; INTRAVENOUS; SUBCUTANEOUS EVERY 10 MIN PRN
Status: DISCONTINUED | OUTPATIENT
Start: 2024-10-22 | End: 2024-10-22 | Stop reason: HOSPADM

## 2024-10-22 RX ORDER — CEPHALEXIN 250 MG/1
250 CAPSULE ORAL 4 TIMES DAILY
Qty: 16 CAPSULE | Refills: 0 | Status: SHIPPED | OUTPATIENT
Start: 2024-10-22 | End: 2024-10-26

## 2024-10-22 RX ORDER — LIDOCAINE HCL/PF 100 MG/5ML
SYRINGE (ML) INTRAVENOUS AS NEEDED
Status: DISCONTINUED | OUTPATIENT
Start: 2024-10-22 | End: 2024-10-22

## 2024-10-22 SDOH — HEALTH STABILITY: MENTAL HEALTH: CURRENT SMOKER: 1

## 2024-10-22 ASSESSMENT — PAIN - FUNCTIONAL ASSESSMENT
PAIN_FUNCTIONAL_ASSESSMENT: 0-10

## 2024-10-22 ASSESSMENT — PAIN SCALES - GENERAL
PAINLEVEL_OUTOF10: 0 - NO PAIN
PAINLEVEL_OUTOF10: 0 - NO PAIN
PAINLEVEL_OUTOF10: 5 - MODERATE PAIN
PAINLEVEL_OUTOF10: 0 - NO PAIN
PAINLEVEL_OUTOF10: 4

## 2024-10-22 ASSESSMENT — COLUMBIA-SUICIDE SEVERITY RATING SCALE - C-SSRS
1. IN THE PAST MONTH, HAVE YOU WISHED YOU WERE DEAD OR WISHED YOU COULD GO TO SLEEP AND NOT WAKE UP?: NO
6. HAVE YOU EVER DONE ANYTHING, STARTED TO DO ANYTHING, OR PREPARED TO DO ANYTHING TO END YOUR LIFE?: NO
2. HAVE YOU ACTUALLY HAD ANY THOUGHTS OF KILLING YOURSELF?: NO

## 2024-10-22 ASSESSMENT — PAIN DESCRIPTION - DESCRIPTORS: DESCRIPTORS: ACHING

## 2024-10-22 NOTE — ANESTHESIA PREPROCEDURE EVALUATION
"Patient: Andi Jovel \"Cyrus\"    Procedure Information       Date/Time: 10/22/24 1200    Procedure: CYSTOSCOPY WITH RETROGRADE PYELOGRAM WITH C-ARM - Cystoscopy with Retrograde Pyelogram    Location: [facility] / [facility]    Surgeons: Sudheer Matthew MD            Relevant Problems   Cardiac   (+) Arrhythmia, atrial   (+) Hyperlipidemia      GI   (+) Gastroesophageal reflux disease      /Renal   (+) BPH (benign prostatic hyperplasia)      Endocrine   (+) Diabetic neuropathy (Multi)   (+) Type 2 diabetes mellitus      Hematology   (+) Anemia      Musculoskeletal   (+) Osteoarthritis of both knees      HEENT   (+) Chronic sinusitis       Clinical information reviewed:      Problems              NPO Detail:  No data recorded     Physical Exam    Airway  Mallampati: II  TM distance: >3 FB  Neck ROM: full     Cardiovascular - normal exam  Rhythm: regular  Rate: normal     Dental - normal exam  (+) upper dentures, lower dentures     Pulmonary   Breath sounds clear to auscultation  (+) decreased breath sounds     Abdominal            Anesthesia Plan    History of general anesthesia?: yes  History of complications of general anesthesia?: no    ASA 3     general     The patient is a current smoker.  Patient was previously instructed to abstain from smoking on day of procedure.  Patient smoked on day of procedure.    intravenous induction   Postoperative administration of opioids is intended.  Trial extubation is planned.  Anesthetic plan and risks discussed with patient.  Use of blood products discussed with patient who.    Plan discussed with CAA.      "

## 2024-10-22 NOTE — ANESTHESIA POSTPROCEDURE EVALUATION
"Patient: Andi Jovel \"Cyrus\"    Procedure Summary       Date: 10/22/24 Room / Location: [facility] / [facility]    Anesthesia Start: 1224 Anesthesia Stop: 1303    Procedure: CYSTOSCOPY WITH BILATERAL RETROGRADE PYELOGRAM WITH C-ARM/ WITH TRANSURETERAL RESECTION BLADDER TUMOR (Bladder) Diagnosis:       Gross hematuria      (Gross hematuria [R31.0])    Surgeons: Sudheer Matthew MD Responsible Provider: Johnson Diaz MD    Anesthesia Type: general ASA Status: 3            Anesthesia Type: general    Vitals Value Taken Time   /61 10/22/24 1302   Temp 36.2 10/22/24 1303   Pulse 61 10/22/24 1302   Resp 14 10/22/24 1303   SpO2 95 % 10/22/24 1302   Vitals shown include unfiled device data.    Anesthesia Post Evaluation    Patient participation: complete - patient participated  Level of consciousness: awake  Pain management: adequate  Airway patency: patent  Cardiovascular status: acceptable  Respiratory status: acceptable  Hydration status: acceptable  Postoperative Nausea and Vomiting: none        No notable events documented.    "

## 2024-10-22 NOTE — ANESTHESIA PROCEDURE NOTES
Airway  Date/Time: 10/22/2024 12:31 PM  Urgency: elective    Airway not difficult    Staffing  Performed: KURTIS   Authorized by: Johnson Diaz MD    Performed by: KURTIS Herrera  Patient location during procedure: OR    Indications and Patient Condition  Indications for airway management: anesthesia  Spontaneous ventilation: present  Sedation level: deep  Preoxygenated: yes  Mask difficulty assessment: 0 - not attempted    Final Airway Details  Final airway type: supraglottic airway      Successful airway: Size 5     Number of attempts at approach: 1  Number of other approaches attempted: 0    Additional Comments  [employer]

## 2024-10-22 NOTE — OP NOTE
"CYSTOSCOPY WITH BILATERAL RETROGRADE PYELOGRAM WITH C-ARM/ WITH TRANSURETERAL RESECTION BLADDER TUMOR Operative Note     Date: 10/22/2024  OR Location: [facility]    Name: Andi Jovel \"Cyrus\", : 1950, Age: 74 y.o., MRN: [id number], Sex: male    Diagnosis  Pre-op Diagnosis      * Gross hematuria [R31.0] Post-op Diagnosis     * Gross hematuria [R31.0]     Procedures  CYSTOSCOPY WITH BILATERAL RETROGRADE PYELOGRAM WITH C-ARM/ WITH TRANSURETERAL RESECTION BLADDER TUMOR  05593 - CHG UROGRAPHY RETROGRADE WITH/WO KUB      Surgeons      * Sudheer Matthew - Primary    Resident/Fellow/Other Assistant:  Surgeons and Role:  * No surgeons found with a matching role *    Procedure Summary  Anesthesia: General  ASA: III  Anesthesia Staff: Anesthesiologist: Johnson Diaz MD  C-AA: KURTIS Herrera  Estimated Blood Loss: 5 mL  Intra-op Medications:   Administrations occurring from 1200 to 1315 on 10/22/24:   Medication Name Total Dose   sodium chloride 0.9 % irrigation solution 3,000 mL   iohexol (OMNIPaque) 240 mg iodine/mL solution 5 mL   ceFAZolin (Ancef) 2 g in dextrose (iso)  mL 2 g   fentaNYL (Sublimaze) injection 50 mcg/mL 100 mcg   glycopyrrolate (Robinul) injection 0.2 mg   LR bolus Cannot be calculated   lidocaine (cardiac) injection 2% prefilled syringe 50 mg   ondansetron (Zofran) 2 mg/mL injection 4 mg   propofol (Diprivan) injection 10 mg/mL 200 mg              Anesthesia Record               Intraprocedure I/O Totals          Intake    LR bolus 400.00 mL    Total Intake 400 mL          Specimen:   ID Type Source Tests Collected by Time   1 : RIGHT POSTERIOR BLADDER TUMOR Tissue BLADDER BIOPSY SURGICAL PATHOLOGY EXAM Sudheer Matthew MD 10/22/2024 1240        Staff:   Circulator: Fina Thomas Person: Bree Tubbs Circulator: Cruz         Drains and/or Catheters: * None in log *    Tourniquet Times:         Implants:     Findings: see op report    Indications: Cyrus Jovel is an 74 y.o. male who is having " surgery for Gross hematuria [R31.0].     The patient was seen in the preoperative area. The risks, benefits, complications, treatment options, non-operative alternatives, expected recovery and outcomes were discussed with the patient. The possibilities of reaction to medication, pulmonary aspiration, injury to surrounding structures, bleeding, recurrent infection, the need for additional procedures, failure to diagnose a condition, and creating a complication requiring transfusion or operation were discussed with the patient. The patient concurred with the proposed plan, giving informed consent.  The site of surgery was properly noted/marked if necessary per policy. The patient has been actively warmed in preoperative area. Preoperative antibiotics  Venous thrombosis prophylaxis     Procedure Details: This is a 74-year-old male who presented to my office with gross hematuria and now comes into the operating room to undergo cystoscopy and bilateral retrograde pyelograms.  The patient has an iodine allergy and cannot take intravenous dye.  A recent CAT scan of the abdomen and pelvis performed without intravenous dye was within normal limits aside from small punctate bilateral renal calculi.  Prior to coming into the operating room a huddle was performed in the waiting area and the patient and his wife both agreed that we were proceeding with cystoscopy and bilateral retrograde pyelograms.  The patient was then brought into the operating room and placed on the operating table.  A second huddle was performed and all agreed that we were proceeding with cystoscopy and bilateral retrograde pyelograms.  At this point the patient was placed under a general anesthetic and after this was positioned in the dorsolithotomy position.  The patient's perineum and genital area prepped and draped in usual manner.  Prior to proceeding at this point a third huddle was performed and again all agreed that we were proceeding with  cystoscopy and bilateral retrograde pyelograms.  At this point a #22 [place] Storz cystoscope was introduced into the urethra and under direct vision passed into the bladder.  There was modest prostatic hypertrophy present with lateral lobe predominating.  Upon entering the bladder a papillary tumor was identified on the right posterior wall.  It measured approximately 2 x 1 cm in size.  The remainder the bladder was carefully inspected and no other obvious tumors, stones, foreign bodies or diverticuli were identified.  The ureteral orifice ease were in the normal anatomic position and fully effluxing clear urine bilaterally.  At this point using an 8 Seneg[place]e cone-tip catheter bilateral retrograde pyelograms were performed.  This did not reveal any evidence of intrinsic or extrinsic filling defects in either ureter or renal pelvis.  The studies were felt to be within normal limits.  At this point the cystoscope was removed and a #25 [place] continuous-flow Storz resectoscope was introduced into the bladder atraumatically.  The papillary tumor on the right posterior wall was then resected in its entirety and sent to pathology for evaluation.  The entire site was then cauterized.  After ensuring adequate hemostasis the bladder was drained and the cystoscope was removed.  The patient was placed in the supine position, awakened and transferred to the recovery room in satisfactory condition  Complications:  None; patient tolerated the procedure well.    Disposition: PACU - hemodynamically stable.  Condition: stable         Additional Details: none    Attending Attestation: I was present and scrubbed for the entire procedure.    Sudheer Matthew  Phone Number: [phone]

## 2024-10-23 RX ORDER — OXYCODONE AND ACETAMINOPHEN 5; 325 MG/1; MG/1
1 TABLET ORAL EVERY 6 HOURS PRN
Status: ACTIVE | OUTPATIENT
Start: 2024-10-23

## 2024-10-25 LAB
LABORATORY COMMENT REPORT: NORMAL
PATH REPORT.FINAL DX SPEC: NORMAL
PATH REPORT.GROSS SPEC: NORMAL
PATH REPORT.RELEVANT HX SPEC: NORMAL
PATH REPORT.TOTAL CANCER: NORMAL

## 2024-11-03 NOTE — PROGRESS NOTES
"Subjective   Patient ID: Andi Jovel \"Chase" is a 74 y.o. male who presents for a follow-up after undergoing transurethral resection of a bladder tumor on 10/22/2024.  Patient feels well denying any significant pain and/or discomfort.  He did have urgency and urge incontinence shortly after the procedure but this has resolved nicely over time    A:  Patient status post transurethral resection of a bladder tumor on 10/22/2020--path report reveals  FINAL DIAGNOSIS   Urinary bladder, tumor, transurethral resection:  --Fragments of noninvasive papillary urothelial carcinoma, with tumor grade heterogeneity (see note).     Note: Tumor grade heterogeneity is noted.  The tumor displays low-grade cytologic features, as well as high-grade cytologic features (5-10%).  There is no muscularis propria present for evaluation.   Pathophysiology of the above discussed in detail with the patient and his wife  All questions answered  P:  Reassurance, education rendered to the patient  Follow-up in 3 months for a surveillance cystoscopy  VALIUM 10 MG ONE HOUR PRIOR TO THE CYSTO  Sudheer Matthew MD 11/03/24 12:38 PM   "

## 2024-11-04 ENCOUNTER — OFFICE VISIT (OUTPATIENT)
Dept: UROLOGY | Facility: CLINIC | Age: 74
End: 2024-11-04
Payer: MEDICARE

## 2024-11-04 VITALS
WEIGHT: 216 LBS | HEART RATE: 60 BPM | HEIGHT: 74 IN | DIASTOLIC BLOOD PRESSURE: 52 MMHG | BODY MASS INDEX: 27.72 KG/M2 | SYSTOLIC BLOOD PRESSURE: 109 MMHG | RESPIRATION RATE: 16 BRPM | TEMPERATURE: 98.2 F

## 2024-11-04 DIAGNOSIS — R31.0 GROSS HEMATURIA: ICD-10-CM

## 2024-11-04 DIAGNOSIS — F41.9 ANXIETY: Primary | ICD-10-CM

## 2024-11-04 DIAGNOSIS — C67.9 MALIGNANT NEOPLASM OF URINARY BLADDER, UNSPECIFIED SITE (MULTI): ICD-10-CM

## 2024-11-04 PROCEDURE — 1159F MED LIST DOCD IN RCRD: CPT | Performed by: UROLOGY

## 2024-11-04 PROCEDURE — 3044F HG A1C LEVEL LT 7.0%: CPT | Performed by: UROLOGY

## 2024-11-04 PROCEDURE — 3074F SYST BP LT 130 MM HG: CPT | Performed by: UROLOGY

## 2024-11-04 PROCEDURE — 99211 OFF/OP EST MAY X REQ PHY/QHP: CPT | Performed by: UROLOGY

## 2024-11-04 PROCEDURE — 1160F RVW MEDS BY RX/DR IN RCRD: CPT | Performed by: UROLOGY

## 2024-11-04 PROCEDURE — 1126F AMNT PAIN NOTED NONE PRSNT: CPT | Performed by: UROLOGY

## 2024-11-04 PROCEDURE — 3048F LDL-C <100 MG/DL: CPT | Performed by: UROLOGY

## 2024-11-04 PROCEDURE — 3078F DIAST BP <80 MM HG: CPT | Performed by: UROLOGY

## 2024-11-04 PROCEDURE — 1123F ACP DISCUSS/DSCN MKR DOCD: CPT | Performed by: UROLOGY

## 2024-11-04 PROCEDURE — 81003 URINALYSIS AUTO W/O SCOPE: CPT | Mod: QW | Performed by: UROLOGY

## 2024-11-04 RX ORDER — DIAZEPAM 10 MG/1
TABLET ORAL
Qty: 1 TABLET | Refills: 0 | Status: SHIPPED | OUTPATIENT
Start: 2024-11-04

## 2024-11-04 ASSESSMENT — PAIN SCALES - GENERAL: PAINLEVEL_OUTOF10: 0-NO PAIN

## 2024-11-04 ASSESSMENT — ENCOUNTER SYMPTOMS
LOSS OF SENSATION IN FEET: 0
OCCASIONAL FEELINGS OF UNSTEADINESS: 0
DEPRESSION: 0

## 2024-11-04 NOTE — LETTER
"November 5, 2024     Oni Roy DO  [address] [facility] [address]    Patient: Cyrus Jovel   YOB: 1950   Date of Visit: 11/4/2024       Dear Dr. Oni Roy DO:    Thank you for referring Cyrus Jovel to me for evaluation. Below are my notes for this consultation.  If you have questions, please do not hesitate to call me. I look forward to following your patient along with you.       Sincerely,     Sudheer Matthew MD      CC: No Recipients  ______________________________________________________________________________________    Subjective  Patient ID: Andi Jovel \"Chase" is a 74 y.o. male who presents for a follow-up after undergoing transurethral resection of a bladder tumor on 10/22/2024.  Patient feels well denying any significant pain and/or discomfort.  He did have urgency and urge incontinence shortly after the procedure but this has resolved nicely over time    A:  Patient status post transurethral resection of a bladder tumor on 10/22/2020--path report reveals  FINAL DIAGNOSIS   Urinary bladder, tumor, transurethral resection:  --Fragments of noninvasive papillary urothelial carcinoma, with tumor grade heterogeneity (see note).     Note: Tumor grade heterogeneity is noted.  The tumor displays low-grade cytologic features, as well as high-grade cytologic features (5-10%).  There is no muscularis propria present for evaluation.   Pathophysiology of the above discussed in detail with the patient and his wife  All questions answered  P:  Reassurance, education rendered to the patient  Follow-up in 3 months for a surveillance cystoscopy  VALIUM 10 MG ONE HOUR PRIOR TO THE CYSTO  Sudeher Matthew MD 11/03/24 12:38 PM   "

## 2024-11-08 ENCOUNTER — HOSPITAL ENCOUNTER (OUTPATIENT)
Dept: PAIN MEDICINE | Facility: CLINIC | Age: 74
Discharge: HOME | End: 2024-11-08
Payer: MEDICARE

## 2024-11-08 VITALS
HEART RATE: 73 BPM | SYSTOLIC BLOOD PRESSURE: 113 MMHG | TEMPERATURE: 97.9 F | DIASTOLIC BLOOD PRESSURE: 61 MMHG | OXYGEN SATURATION: 96 % | RESPIRATION RATE: 15 BRPM

## 2024-11-08 DIAGNOSIS — M54.16 LUMBAR RADICULOPATHY: ICD-10-CM

## 2024-11-08 PROCEDURE — 2500000005 HC RX 250 GENERAL PHARMACY W/O HCPCS: Performed by: ANESTHESIOLOGY

## 2024-11-08 PROCEDURE — 62323 NJX INTERLAMINAR LMBR/SAC: CPT | Performed by: ANESTHESIOLOGY

## 2024-11-08 PROCEDURE — 2500000004 HC RX 250 GENERAL PHARMACY W/ HCPCS (ALT 636 FOR OP/ED): Performed by: ANESTHESIOLOGY

## 2024-11-08 RX ORDER — SODIUM CHLORIDE 9 MG/ML
INJECTION, SOLUTION INTRAMUSCULAR; INTRAVENOUS; SUBCUTANEOUS AS NEEDED
Status: COMPLETED | OUTPATIENT
Start: 2024-11-08 | End: 2024-11-08

## 2024-11-08 RX ORDER — LIDOCAINE HYDROCHLORIDE 5 MG/ML
INJECTION, SOLUTION INFILTRATION; INTRAVENOUS AS NEEDED
Status: COMPLETED | OUTPATIENT
Start: 2024-11-08 | End: 2024-11-08

## 2024-11-08 RX ORDER — TRIAMCINOLONE ACETONIDE 40 MG/ML
INJECTION, SUSPENSION INTRA-ARTICULAR; INTRAMUSCULAR AS NEEDED
Status: COMPLETED | OUTPATIENT
Start: 2024-11-08 | End: 2024-11-08

## 2024-11-08 ASSESSMENT — PAIN - FUNCTIONAL ASSESSMENT
PAIN_FUNCTIONAL_ASSESSMENT: 0-10
PAIN_FUNCTIONAL_ASSESSMENT: 0-10

## 2024-11-08 ASSESSMENT — COLUMBIA-SUICIDE SEVERITY RATING SCALE - C-SSRS
6. HAVE YOU EVER DONE ANYTHING, STARTED TO DO ANYTHING, OR PREPARED TO DO ANYTHING TO END YOUR LIFE?: NO
2. HAVE YOU ACTUALLY HAD ANY THOUGHTS OF KILLING YOURSELF?: NO
1. IN THE PAST MONTH, HAVE YOU WISHED YOU WERE DEAD OR WISHED YOU COULD GO TO SLEEP AND NOT WAKE UP?: NO

## 2024-11-08 ASSESSMENT — ENCOUNTER SYMPTOMS
OCCASIONAL FEELINGS OF UNSTEADINESS: 1
DEPRESSION: 1
LOSS OF SENSATION IN FEET: 1

## 2024-11-08 ASSESSMENT — PAIN DESCRIPTION - DESCRIPTORS: DESCRIPTORS: ACHING;SORE

## 2024-11-08 ASSESSMENT — PAIN SCALES - GENERAL
PAINLEVEL_OUTOF10: 6
PAINLEVEL_OUTOF10: 0 - NO PAIN

## 2024-11-10 DIAGNOSIS — E11.40 TYPE 2 DIABETES MELLITUS WITH DIABETIC NEUROPATHY, UNSPECIFIED (MULTI): ICD-10-CM

## 2024-11-10 DIAGNOSIS — E11.42 DIABETIC POLYNEUROPATHY ASSOCIATED WITH TYPE 2 DIABETES MELLITUS (MULTI): ICD-10-CM

## 2024-11-11 DIAGNOSIS — E11.40 TYPE 2 DIABETES MELLITUS WITH DIABETIC NEUROPATHY, UNSPECIFIED (MULTI): ICD-10-CM

## 2024-11-11 DIAGNOSIS — E11.9 TYPE 2 DIABETES MELLITUS WITHOUT COMPLICATION, WITHOUT LONG-TERM CURRENT USE OF INSULIN (MULTI): ICD-10-CM

## 2024-11-11 DIAGNOSIS — E11.42 DIABETIC POLYNEUROPATHY ASSOCIATED WITH TYPE 2 DIABETES MELLITUS (MULTI): ICD-10-CM

## 2024-11-11 RX ORDER — PREGABALIN 150 MG/1
150 CAPSULE ORAL 2 TIMES DAILY
Qty: 180 CAPSULE | Refills: 1 | Status: SHIPPED | OUTPATIENT
Start: 2024-11-11

## 2024-11-11 RX ORDER — PREGABALIN 150 MG/1
150 CAPSULE ORAL 2 TIMES DAILY
Qty: 60 CAPSULE | Refills: 3 | Status: SHIPPED | OUTPATIENT
Start: 2024-11-11 | End: 2024-11-11 | Stop reason: SDUPTHER

## 2024-11-25 ENCOUNTER — APPOINTMENT (OUTPATIENT)
Dept: UROLOGY | Facility: CLINIC | Age: 74
End: 2024-11-25
Payer: MEDICARE

## 2024-12-08 DIAGNOSIS — E11.9 TYPE 2 DIABETES MELLITUS WITHOUT COMPLICATION, WITHOUT LONG-TERM CURRENT USE OF INSULIN (MULTI): ICD-10-CM

## 2024-12-09 ENCOUNTER — OFFICE VISIT (OUTPATIENT)
Dept: PAIN MEDICINE | Facility: CLINIC | Age: 74
End: 2024-12-09
Payer: MEDICARE

## 2024-12-09 VITALS
BODY MASS INDEX: 27.72 KG/M2 | TEMPERATURE: 96.8 F | WEIGHT: 216 LBS | DIASTOLIC BLOOD PRESSURE: 55 MMHG | HEIGHT: 74 IN | SYSTOLIC BLOOD PRESSURE: 106 MMHG | RESPIRATION RATE: 15 BRPM | HEART RATE: 67 BPM | OXYGEN SATURATION: 96 %

## 2024-12-09 DIAGNOSIS — M54.16 LUMBAR NEURITIS: Primary | ICD-10-CM

## 2024-12-09 DIAGNOSIS — E11.42 DIABETIC POLYNEUROPATHY ASSOCIATED WITH TYPE 2 DIABETES MELLITUS (MULTI): ICD-10-CM

## 2024-12-09 DIAGNOSIS — M51.26 LUMBAR DISC HERNIATION: ICD-10-CM

## 2024-12-09 PROCEDURE — 99214 OFFICE O/P EST MOD 30 MIN: CPT | Performed by: ANESTHESIOLOGY

## 2024-12-09 RX ORDER — SITAGLIPTIN 50 MG/1
50 TABLET, FILM COATED ORAL DAILY
Qty: 30 TABLET | Refills: 0 | Status: SHIPPED | OUTPATIENT
Start: 2024-12-09

## 2024-12-09 RX ORDER — DULOXETIN HYDROCHLORIDE 20 MG/1
20 CAPSULE, DELAYED RELEASE ORAL NIGHTLY
Qty: 30 CAPSULE | Refills: 3 | Status: SHIPPED | OUTPATIENT
Start: 2024-12-09 | End: 2025-01-08

## 2024-12-09 ASSESSMENT — PAIN SCALES - GENERAL
PAINLEVEL_OUTOF10: 7
PAINLEVEL_OUTOF10: 7

## 2024-12-09 ASSESSMENT — ENCOUNTER SYMPTOMS
OCCASIONAL FEELINGS OF UNSTEADINESS: 1
LOSS OF SENSATION IN FEET: 1

## 2024-12-09 ASSESSMENT — PAIN DESCRIPTION - DESCRIPTORS: DESCRIPTORS: NAGGING;DISCOMFORT

## 2024-12-09 ASSESSMENT — PAIN - FUNCTIONAL ASSESSMENT: PAIN_FUNCTIONAL_ASSESSMENT: 0-10

## 2024-12-09 NOTE — PROGRESS NOTES
"History Of Present Illness  Andi Jovel \"Chase" is a 74 y.o. male presenting with   Chief Complaint   Patient presents with    Follow-up       Patient presents with complaints of chronic low back pain to the B/L LE with N/T in bilateral LE. The pain is constant, worse with activity and better with rest. The pain is sharp, stabbing and shooting to the B/L LE. Denies LE paresthesias, weakness, saddle anesthesia, bowel or bladder incontinence. To manage this pain the patient has attempted Cyclobenzaprine, ASA with some relief.  The patients chronic DM, DLD, GERD, BPH are stable on medication management.   \  PAIN SCORE: 7/10.    PCP: Dr. Roy        Past Medical History  He has a past medical history of Arthritis, Diabetes 1.5, managed as type 2 (Multi), HL (hearing loss), Hyperlipidemia, Joint pain, and Nephrolithiasis.    Surgical History  He has a past surgical history that includes XR knee; Tonsillectomy; Adenoidectomy; Colonoscopy; and Meniscectomy.     Social History  He reports that he has been smoking cigarettes. He has never used smokeless tobacco. He reports that he does not currently use alcohol. He reports that he does not use drugs.    Family History  Family History   Problem Relation Name Age of Onset    Heart disease Mother      Lung cancer Father          Allergies  Patient has no known allergies.    Review of Systems    All other systems reviewed and negative for any deficits. Pertinent positives and negatives were considered in the medical decision making process.        Physical Exam  /55   Pulse 67   Temp 36 °C (96.8 °F)   Resp 15   Wt 98 kg (216 lb)   SpO2 96%     General: Pt appears stated age    Eyes: Conjunctiva non-icteric and lids without obvious rash or drooping. Pupils are symmetric.    ENT: External ears and nose appear to be without deformity or rash. No lesions or masses noted. Hearing is grossly intact.    Respiratory: No gasping or shortness of breath noted. No use of " accessory muscles noted.    CVS: Extremities show no edema or varicosities    Skin: No rashes or open lesions/ulcers identified on skin. No induration/tightening noted with palpation of skin.     Musculoskeletal: [place] is grossly normal.    Stability: No subluxation noted on movement of bilateral upper extremities or head/neck.     Strength: 4/5 in RLE and 4/5 in LLE     Range of Motion: WNL    Neurologic: Reflexes 2+    Sensation: DEC TO SHARP TOUCH IN RLE ALONG     Neurologic: Cranial Nerves II thru XII are grossly intact    Psychiatric: Pt is alert and oriented to time, person, and place       Assessment/Plan   No diagnosis found.    I have provided the patient with a list of physical therapy exercises to learn and perform to strengthen core, maintain stabilization, and reduce pain. We reviewed the exercises in detail and I encouraged them to perform them on a regular basis.  I would recommend the pt continue ON LYRICA to help with nerve related pain. We discussed the risks, benefits, and side effects to this medication including the mechanism of action and the pt understands and agrees.  I extensively reviewed the patients CT Scan findings in detail, including review of the actual images and provided a detailed explanation of the findings using a spine model. There is noted significant disc degeneration at the L4/5 and L5/S1 levels with canal stenosis at the L4/5 level.   The patient is a candidate for an LESI L5/S1 to treat low back and radicular pain. I spent time with the patient discussing the risks, benefits, and alternatives to this measure including, but not limited to worsening pain with injection, no improvement/guarantee with the procedure, numbness in the lower extremity and risk of falls post procedure, vagal reaction/ hypotension, feeling dizzy / lightheaded, spinal infection, worsening pre-existing infections, epidural hematoma, epidural abscess, nerve injury, paralysis, spinal fluid leak and spinal  headache. The patient understands all of these risks and agrees to proceed with the planned procedure. His last injection was on 11-8-2024 and he reported approx 50% relief of his pain that lasted for 1 months time. He was able to stand and walk with less pain.   I would recommend the pt start on DULOXETINE to help with nerve related pain. We discussed the risks, benefits, and side effects to this medication including the mechanism of action and the pt understands and agrees.         Buster Howard MD    I spent time with the patient reviewing their imaging and discussing the risks benefits and alternatives to the above plan. A total of 45 minutes was spent reviewing the data and greater than 50% of that time was with the patient during the face to face encounter discussing treatment options both surgical, non-surgical, and minimally invasive techniques.

## 2025-01-16 ENCOUNTER — APPOINTMENT (OUTPATIENT)
Dept: PAIN MEDICINE | Facility: CLINIC | Age: 75
End: 2025-01-16
Payer: MEDICARE

## 2025-01-23 ENCOUNTER — HOSPITAL ENCOUNTER (OUTPATIENT)
Dept: PAIN MEDICINE | Facility: CLINIC | Age: 75
Discharge: HOME | End: 2025-01-23
Payer: MEDICARE

## 2025-01-23 VITALS
HEART RATE: 66 BPM | DIASTOLIC BLOOD PRESSURE: 52 MMHG | TEMPERATURE: 97.5 F | OXYGEN SATURATION: 96 % | BODY MASS INDEX: 28.6 KG/M2 | HEIGHT: 75 IN | RESPIRATION RATE: 15 BRPM | WEIGHT: 230 LBS | SYSTOLIC BLOOD PRESSURE: 118 MMHG

## 2025-01-23 DIAGNOSIS — M54.16 LUMBAR NEURITIS: ICD-10-CM

## 2025-01-23 PROCEDURE — 2500000005 HC RX 250 GENERAL PHARMACY W/O HCPCS: Performed by: ANESTHESIOLOGY

## 2025-01-23 PROCEDURE — 2500000004 HC RX 250 GENERAL PHARMACY W/ HCPCS (ALT 636 FOR OP/ED): Performed by: ANESTHESIOLOGY

## 2025-01-23 PROCEDURE — 62323 NJX INTERLAMINAR LMBR/SAC: CPT | Performed by: ANESTHESIOLOGY

## 2025-01-23 RX ORDER — SODIUM CHLORIDE 9 MG/ML
INJECTION, SOLUTION INTRAMUSCULAR; INTRAVENOUS; SUBCUTANEOUS AS NEEDED
Status: COMPLETED | OUTPATIENT
Start: 2025-01-23 | End: 2025-01-23

## 2025-01-23 RX ORDER — TRIAMCINOLONE ACETONIDE 40 MG/ML
INJECTION, SUSPENSION INTRA-ARTICULAR; INTRAMUSCULAR AS NEEDED
Status: COMPLETED | OUTPATIENT
Start: 2025-01-23 | End: 2025-01-23

## 2025-01-23 RX ORDER — LIDOCAINE HYDROCHLORIDE 5 MG/ML
INJECTION, SOLUTION INFILTRATION; INTRAVENOUS AS NEEDED
Status: COMPLETED | OUTPATIENT
Start: 2025-01-23 | End: 2025-01-23

## 2025-01-23 RX ADMIN — TRIAMCINOLONE ACETONIDE 40 MG: 40 INJECTION, SUSPENSION INTRA-ARTICULAR; INTRAMUSCULAR at 14:20

## 2025-01-23 RX ADMIN — SODIUM CHLORIDE 5 ML: 9 INJECTION, SOLUTION INTRAMUSCULAR; INTRAVENOUS; SUBCUTANEOUS at 14:20

## 2025-01-23 RX ADMIN — LIDOCAINE HYDROCHLORIDE 5 ML: 5 INJECTION, SOLUTION INFILTRATION at 14:20

## 2025-01-23 ASSESSMENT — ENCOUNTER SYMPTOMS
EYE DISCHARGE: 0
OCCASIONAL FEELINGS OF UNSTEADINESS: 1
DIARRHEA: 0
DEPRESSION: 0
SHORTNESS OF BREATH: 0
CHEST TIGHTNESS: 0
NAUSEA: 0
BACK PAIN: 1
FEVER: 0
BLOOD IN STOOL: 0
DIZZINESS: 0
NECK PAIN: 0
WEAKNESS: 0
LOSS OF SENSATION IN FEET: 1
SEIZURES: 0
CHILLS: 0
NUMBNESS: 1
DIFFICULTY URINATING: 0
COUGH: 0
CONSTIPATION: 0
SPEECH DIFFICULTY: 0
WHEEZING: 0
VOMITING: 0
NECK STIFFNESS: 0
ARTHRALGIAS: 1
DIAPHORESIS: 0

## 2025-01-23 ASSESSMENT — PAIN - FUNCTIONAL ASSESSMENT: PAIN_FUNCTIONAL_ASSESSMENT: 0-10

## 2025-01-23 ASSESSMENT — PAIN SCALES - GENERAL
PAINLEVEL_OUTOF10: 8
PAINLEVEL_OUTOF10: 6

## 2025-01-23 ASSESSMENT — PAIN DESCRIPTION - DESCRIPTORS: DESCRIPTORS: DULL;BURNING

## 2025-01-23 NOTE — H&P
"History Of Present Illness  Andi Jovel \"Chase" is a 74 y.o. male presenting with lumbar neuritis.     Past Medical History  Past Medical History:   Diagnosis Date    Arthritis     Diabetes 1.5, managed as type 2 (Multi)     HL (hearing loss)     Hyperlipidemia     Joint pain     Nephrolithiasis        Surgical History  Past Surgical History:   Procedure Laterality Date    ADENOIDECTOMY      COLONOSCOPY      KNEE      MENISCECTOMY      TONSILLECTOMY          Social History  He reports that he has been smoking cigarettes. He has never used smokeless tobacco. He reports that he does not currently use alcohol. He reports that he does not use drugs.    Family History  Family History   Problem Relation Name Age of Onset    Heart disease Mother      Lung cancer Father          Allergies  Patient has no known allergies.    Review of Systems   Constitutional:  Negative for chills, diaphoresis and fever.   HENT:  Negative for ear discharge and tinnitus.    Eyes:  Negative for discharge.   Respiratory:  Negative for cough, chest tightness, shortness of breath and wheezing.    Cardiovascular:  Negative for chest pain.   Gastrointestinal:  Negative for blood in stool, constipation, diarrhea, nausea and vomiting.   Endocrine: Negative for polyuria.   Genitourinary:  Negative for difficulty urinating.   Musculoskeletal:  Positive for arthralgias, back pain and gait problem. Negative for neck pain and neck stiffness.   Skin:  Negative for rash.   Neurological:  Positive for numbness. Negative for dizziness, seizures, speech difficulty and weakness.        Physical Exam  Constitutional:       Appearance: Normal appearance.   HENT:      Head: Normocephalic.      Nose: Nose normal.      Mouth/Throat:      Mouth: Mucous membranes are moist.      Pharynx: Oropharynx is clear.   Eyes:      Extraocular Movements: Extraocular movements intact.      Conjunctiva/sclera: Conjunctivae normal.      Pupils: Pupils are equal, round, and " "reactive to light.   Cardiovascular:      Rate and Rhythm: Normal rate.   Pulmonary:      Effort: Pulmonary effort is normal. No respiratory distress.      Breath sounds: No wheezing.   Chest:      Chest wall: No tenderness.   Abdominal:      Palpations: Abdomen is soft.   Musculoskeletal:      Cervical back: No rigidity.   Skin:     General: Skin is warm and dry.      Findings: No rash.   Neurological:      Mental Status: He is alert and oriented to person, place, and time.      Sensory: Sensory deficit present.      Motor: Weakness present.      Gait: Gait abnormal.   Psychiatric:         Mood and Affect: Mood normal.         Behavior: Behavior normal.          Last Recorded Vitals  Blood pressure 118/52, pulse 65, temperature 36.4 °C (97.5 °F), resp. rate 16, height 1.905 m (6' 3\"), weight 104 kg (230 lb), SpO2 97%.       Assessment/Plan   1. Lumbar neuritis  Epidural Steroid Injection    Epidural Steroid Injection    FL pain management    FL pain management           Buster Howard MD    "

## 2025-01-28 DIAGNOSIS — M54.16 LUMBAR NEURITIS: ICD-10-CM

## 2025-01-30 DIAGNOSIS — M54.16 LUMBAR NEURITIS: ICD-10-CM

## 2025-01-30 RX ORDER — DULOXETIN HYDROCHLORIDE 20 MG/1
20 CAPSULE, DELAYED RELEASE ORAL NIGHTLY
Qty: 90 CAPSULE | Refills: 1 | OUTPATIENT
Start: 2025-01-30 | End: 2025-03-01

## 2025-01-30 RX ORDER — DULOXETIN HYDROCHLORIDE 20 MG/1
20 CAPSULE, DELAYED RELEASE ORAL NIGHTLY
Qty: 30 CAPSULE | Refills: 11 | Status: SHIPPED | OUTPATIENT
Start: 2025-01-30 | End: 2025-03-01

## 2025-01-31 RX ORDER — LIDOCAINE HYDROCHLORIDE 20 MG/ML
1 JELLY TOPICAL ONCE
Status: COMPLETED | OUTPATIENT
Start: 2025-01-31 | End: 2025-02-05

## 2025-02-04 NOTE — PROGRESS NOTES
"Patient ID: Andi Jovel \"Chase" is a 74 y.o. male  PRESENTS FOR A SURVEILLANCE CYSTO IN F/U ON HIS H/O BLADDER CANCER.      PROCEDURE: CYSTOSCOPY   PRE OP:  H/O BLADDER CANCER  POST OP -- NORMAL CYSTOSCOPY  SURGEON -- MD MARCE  ANES:  1 % LIDOCAINE    FINDINGS:    After informed consent was obtained, the patient was taken to the procedure room for cystoscopy due to A H/O BLADDER CANCER.     Cystoscopy:    Procedure Note:    A sterile prep and drape was performed in standard fashion. Lidocaine was used for topical anesthesia. A flexible cystoscope was inserted into the urethra without difficulty revealing normal urethra.     The prostate obstructing.     Then entered the bladder revealing bladder mucosa with no erythematous patches or plaques, foreign bodies, stones or papillary lesions. The ureteral orifices were visualized bilaterally. These were orthotopic in location and effluxing clear urine. No masses were seen on retroflexion.    Post-Procedure:   The cystoscope was removed. The vital signs were stable. The patient tolerated the procedure well. There were no complications.     URINALYSIS DIPSTICK-- TRACE OF KETONES     Assessment/Plan     A:  PT S/P , BILATERAL RETROGRADE PYELOGRAMS, TUR OF A BLADDER TUMOR ON 10-22-24-- PATH REVEALS   FINAL DIAGNOSIS   Urinary bladder, tumor, transurethral resection:  --Fragments of noninvasive papillary urothelial carcinoma, with tumor grade heterogeneity (see note).     Note: Tumor grade heterogeneity is noted.  The tumor displays low-grade cytologic features, as well as high-grade cytologic features (5-10%).  There is no muscularis propria present for evaluation.   NORMAL SURVEILLANCE CYSTO TODAY   P:  REASSURANCE, EDUCATION, AND SUPPORTIVE MEASURES RENDERED TO PATIENT.   PATIENT SENT HOME WITH 3-DAY SUPPLY OF KEFLEX.   FOLLOW UP IN THREE MONTHS FOR REPEAT CYSTOSCOPY.   VALIUM 10 MG ONE HOUR PRIOR TO THE CYSTO  URINE FOR A CYTOLOGY    Scribe Attestation  By signing my " name below, Daniela BOWERS, Melly   attest that this documentation has been prepared under the direction and in the presence of Sudheer Matthew MD.    Sudheer Matthew MD 02/04/25 3:32 PM

## 2025-02-05 ENCOUNTER — PROCEDURE VISIT (OUTPATIENT)
Dept: UROLOGY | Facility: CLINIC | Age: 75
End: 2025-02-05
Payer: MEDICARE

## 2025-02-05 VITALS
BODY MASS INDEX: 28.23 KG/M2 | SYSTOLIC BLOOD PRESSURE: 110 MMHG | TEMPERATURE: 97.2 F | RESPIRATION RATE: 16 BRPM | HEART RATE: 79 BPM | WEIGHT: 227 LBS | DIASTOLIC BLOOD PRESSURE: 61 MMHG | HEIGHT: 75 IN

## 2025-02-05 DIAGNOSIS — F41.9 ANXIETY: Primary | ICD-10-CM

## 2025-02-05 DIAGNOSIS — C67.9 MALIGNANT NEOPLASM OF URINARY BLADDER, UNSPECIFIED SITE (MULTI): ICD-10-CM

## 2025-02-05 LAB
POC APPEARANCE, URINE: CLEAR
POC BILIRUBIN, URINE: NEGATIVE
POC BLOOD, URINE: NEGATIVE
POC COLOR, URINE: YELLOW
POC GLUCOSE, URINE: NEGATIVE MG/DL
POC KETONES, URINE: ABNORMAL MG/DL
POC LEUKOCYTES, URINE: NEGATIVE
POC NITRITE,URINE: NEGATIVE
POC PH, URINE: 5.5 PH
POC PROTEIN, URINE: NEGATIVE MG/DL
POC SPECIFIC GRAVITY, URINE: 1.02
POC UROBILINOGEN, URINE: 0.2 EU/DL

## 2025-02-05 PROCEDURE — 52000 CYSTOURETHROSCOPY: CPT | Performed by: UROLOGY

## 2025-02-05 PROCEDURE — 81003 URINALYSIS AUTO W/O SCOPE: CPT | Mod: QW | Performed by: UROLOGY

## 2025-02-05 PROCEDURE — 2500000005 HC RX 250 GENERAL PHARMACY W/O HCPCS: Performed by: UROLOGY

## 2025-02-05 RX ORDER — DIAZEPAM 10 MG/1
10 TABLET ORAL EVERY 8 HOURS PRN
Qty: 1 TABLET | Refills: 0 | Status: SHIPPED | OUTPATIENT
Start: 2025-02-05 | End: 2025-02-12

## 2025-02-05 RX ORDER — CEPHALEXIN 250 MG/1
250 CAPSULE ORAL 2 TIMES DAILY
Qty: 4 CAPSULE | Refills: 0 | Status: SHIPPED | OUTPATIENT
Start: 2025-02-05 | End: 2025-02-07

## 2025-02-05 RX ADMIN — LIDOCAINE HYDROCHLORIDE 1 APPLICATION: 20 JELLY TOPICAL at 14:42

## 2025-02-05 ASSESSMENT — LIFESTYLE VARIABLES
SKIP TO QUESTIONS 9-10: 1
AUDIT-C TOTAL SCORE: 1
HOW MANY STANDARD DRINKS CONTAINING ALCOHOL DO YOU HAVE ON A TYPICAL DAY: 1 OR 2
HOW OFTEN DO YOU HAVE A DRINK CONTAINING ALCOHOL: MONTHLY OR LESS
HOW OFTEN DO YOU HAVE SIX OR MORE DRINKS ON ONE OCCASION: NEVER

## 2025-02-05 ASSESSMENT — ENCOUNTER SYMPTOMS
DEPRESSION: 0
LOSS OF SENSATION IN FEET: 1
OCCASIONAL FEELINGS OF UNSTEADINESS: 1

## 2025-02-05 ASSESSMENT — PAIN SCALES - GENERAL: PAINLEVEL_OUTOF10: 0-NO PAIN

## 2025-02-05 NOTE — LETTER
"February 8, 2025     Oni Roy DO  [address] [facility] [address]    Patient: Cyrus Jovel   YOB: 1950   Date of Visit: 2/5/2025       Dear Dr. Oni Roy DO:    Thank you for referring Cyrus Jovel to me for evaluation. Below are my notes for this consultation.  If you have questions, please do not hesitate to call me. I look forward to following your patient along with you.       Sincerely,     Sudheer Matthew MD      CC: No Recipients  ______________________________________________________________________________________    Patient ID: Andi Jovel \"Chase" is a 74 y.o. male  PRESENTS FOR A SURVEILLANCE CYSTO IN F/U ON HIS H/O BLADDER CANCER.      PROCEDURE: CYSTOSCOPY   PRE OP:  H/O BLADDER CANCER  POST OP -- NORMAL CYSTOSCOPY  SURGEON -- MD MARCE  ANES:  1 % LIDOCAINE    FINDINGS:    After informed consent was obtained, the patient was taken to the procedure room for cystoscopy due to A H/O BLADDER CANCER.     Cystoscopy:    Procedure Note:    A sterile prep and drape was performed in standard fashion. Lidocaine was used for topical anesthesia. A flexible cystoscope was inserted into the urethra without difficulty revealing normal urethra.     The prostate obstructing.     Then entered the bladder revealing bladder mucosa with no erythematous patches or plaques, foreign bodies, stones or papillary lesions. The ureteral orifices were visualized bilaterally. These were orthotopic in location and effluxing clear urine. No masses were seen on retroflexion.    Post-Procedure:   The cystoscope was removed. The vital signs were stable. The patient tolerated the procedure well. There were no complications.     URINALYSIS DIPSTICK-- TRACE OF KETONES     Assessment/Plan    A:  PT S/P , BILATERAL RETROGRADE PYELOGRAMS, TUR OF A BLADDER TUMOR ON 10-22-24-- PATH REVEALS   FINAL DIAGNOSIS   Urinary bladder, tumor, transurethral resection:  --Fragments of noninvasive papillary urothelial carcinoma, " with tumor grade heterogeneity (see note).     Note: Tumor grade heterogeneity is noted.  The tumor displays low-grade cytologic features, as well as high-grade cytologic features (5-10%).  There is no muscularis propria present for evaluation.   NORMAL SURVEILLANCE CYSTO TODAY   P:  REASSURANCE, EDUCATION, AND SUPPORTIVE MEASURES RENDERED TO PATIENT.   PATIENT SENT HOME WITH 3-DAY SUPPLY OF KEFLEX.   FOLLOW UP IN THREE MONTHS FOR REPEAT CYSTOSCOPY.   VALIUM 10 MG ONE HOUR PRIOR TO THE CYSTO  URINE FOR A CYTOLOGY    Scribe Attestation  By signing my name below, Daniela BOWERS, Scribe   attest that this documentation has been prepared under the direction and in the presence of Sudheer Matthew MD.    Sudheer Matthew MD 02/04/25 3:32 PM

## 2025-02-10 DIAGNOSIS — E11.9 TYPE 2 DIABETES MELLITUS WITHOUT COMPLICATION, UNSPECIFIED WHETHER LONG TERM INSULIN USE (MULTI): ICD-10-CM

## 2025-02-11 DIAGNOSIS — E11.9 TYPE 2 DIABETES MELLITUS WITHOUT COMPLICATION, WITHOUT LONG-TERM CURRENT USE OF INSULIN (MULTI): ICD-10-CM

## 2025-02-11 RX ORDER — GLIMEPIRIDE 2 MG/1
2 TABLET ORAL DAILY
Qty: 90 TABLET | Refills: 1 | Status: SHIPPED | OUTPATIENT
Start: 2025-02-11

## 2025-02-12 RX ORDER — SITAGLIPTIN 50 MG/1
50 TABLET, FILM COATED ORAL DAILY
Qty: 30 TABLET | Refills: 0 | Status: SHIPPED | OUTPATIENT
Start: 2025-02-12

## 2025-02-17 DIAGNOSIS — E11.9 TYPE 2 DIABETES MELLITUS WITHOUT COMPLICATION, UNSPECIFIED WHETHER LONG TERM INSULIN USE (MULTI): ICD-10-CM

## 2025-02-17 RX ORDER — ATORVASTATIN CALCIUM 80 MG/1
80 TABLET, FILM COATED ORAL DAILY
Qty: 90 TABLET | Refills: 1 | Status: SHIPPED | OUTPATIENT
Start: 2025-02-17

## 2025-04-28 DIAGNOSIS — E11.40 TYPE 2 DIABETES MELLITUS WITH DIABETIC NEUROPATHY, UNSPECIFIED (MULTI): ICD-10-CM

## 2025-04-28 DIAGNOSIS — E11.42 DIABETIC POLYNEUROPATHY ASSOCIATED WITH TYPE 2 DIABETES MELLITUS: ICD-10-CM

## 2025-04-29 RX ORDER — PREGABALIN 150 MG/1
150 CAPSULE ORAL 2 TIMES DAILY
Qty: 180 CAPSULE | Refills: 1 | Status: SHIPPED | OUTPATIENT
Start: 2025-04-29

## 2025-05-06 ENCOUNTER — APPOINTMENT (OUTPATIENT)
Dept: PRIMARY CARE | Facility: CLINIC | Age: 75
End: 2025-05-06
Payer: MEDICARE

## 2025-05-07 ENCOUNTER — APPOINTMENT (OUTPATIENT)
Age: 75
End: 2025-05-07
Payer: MEDICARE

## 2025-05-07 RX ORDER — LIDOCAINE HYDROCHLORIDE 20 MG/ML
1 JELLY TOPICAL ONCE
Status: COMPLETED | OUTPATIENT
Start: 2025-05-07 | End: 2025-05-12

## 2025-05-11 NOTE — PROGRESS NOTES
"Patient ID: Andi Jovel \"Chase" is a 74 y.o. male. PRESENTS FOR A SURVEILLANCE CYSTO IN F/U ON HIS H/O BLADDER CANCER.       PROCEDURE: CYSTOSCOPY   PRE OP:  H/O BLADDER CANCER  POST OP --RECURRENT TUMOR AT THE 10:00 POSITION OF THE BLADDER NECK  SURGEON -- MD MARCE  ANES:  1 % LIDOCAINE     FINDINGS:     After informed consent was obtained, the patient was taken to the procedure room for cystoscopy due to A H/O BLADDER CANCER.      Cystoscopy:     Procedure Note:    A sterile prep and drape was performed in standard fashion. Lidocaine was used for topical anesthesia. A flexible cystoscope was inserted into the urethra without difficulty revealing normal urethra.      The prostate obstructing.      Then entered the bladder revealing bladder mucosa with no erythematous patches or plaques, foreign bodies, OR  stones.  SMALL PAPILLARY LESION NOTED AT THE 10:00 POSITION OF THE BLADDER NECK. The ureteral orifices were visualized bilaterally. These were orthotopic in location and effluxing clear urine. No masses were seen on retroflexion.     Post-Procedure:   The cystoscope was removed. The vital signs were stable. The patient tolerated the procedure well. There were no complications.      URINALYSIS DIPSTICK--  WNL    PSA:  8/27/2024--3.46     Assessment/Plan  A:  RECURRENT UROTHELIAL CARCINOMA AT THE 10:00 POSITION OF THE BLADDER NECK ON TODAY'S CYSTO    PMH:  PT S/P , BILATERAL RETROGRADE PYELOGRAMS, TUR OF A BLADDER TUMOR ON 10-22-24-- PATH REVEALS   FINAL DIAGNOSIS   Urinary bladder, tumor, transurethral resection:  --Fragments of noninvasive papillary urothelial carcinoma, with tumor grade heterogeneity (see note).     Note: Tumor grade heterogeneity is noted.  The tumor displays low-grade cytologic features, as well as high-grade cytologic features (5-10%).  There is no muscularis propria present for evaluation.     P:  SCHEDULE:  CYSTO, TURBT, OUT PT, GEN ANES.   KEFLEX 250 MG BID FOR 2 DAYS   5-12-25  OLE " OZ ZHENG MD  5-12-25  15:00

## 2025-05-12 ENCOUNTER — APPOINTMENT (OUTPATIENT)
Age: 75
End: 2025-05-12
Payer: MEDICARE

## 2025-05-12 VITALS — SYSTOLIC BLOOD PRESSURE: 91 MMHG | DIASTOLIC BLOOD PRESSURE: 47 MMHG | HEART RATE: 74 BPM

## 2025-05-12 DIAGNOSIS — Z79.2 PROPHYLACTIC ANTIBIOTIC: ICD-10-CM

## 2025-05-12 DIAGNOSIS — C67.9 MALIGNANT NEOPLASM OF URINARY BLADDER, UNSPECIFIED SITE (MULTI): ICD-10-CM

## 2025-05-12 LAB
POC APPEARANCE, URINE: CLEAR
POC BILIRUBIN, URINE: NEGATIVE
POC BLOOD, URINE: NEGATIVE
POC COLOR, URINE: YELLOW
POC GLUCOSE, URINE: NEGATIVE MG/DL
POC KETONES, URINE: NEGATIVE MG/DL
POC LEUKOCYTES, URINE: NEGATIVE
POC NITRITE,URINE: NEGATIVE
POC PH, URINE: 5.5 PH
POC PROTEIN, URINE: NEGATIVE MG/DL
POC SPECIFIC GRAVITY, URINE: 1.01
POC UROBILINOGEN, URINE: 0.2 EU/DL

## 2025-05-12 PROCEDURE — 99213 OFFICE O/P EST LOW 20 MIN: CPT | Performed by: UROLOGY

## 2025-05-12 PROCEDURE — 52000 CYSTOURETHROSCOPY: CPT | Performed by: UROLOGY

## 2025-05-12 PROCEDURE — 81003 URINALYSIS AUTO W/O SCOPE: CPT | Performed by: UROLOGY

## 2025-05-12 RX ORDER — HYDROCORTISONE 25 MG/G
CREAM TOPICAL 2 TIMES DAILY
COMMUNITY
Start: 2025-03-25

## 2025-05-12 RX ORDER — CEPHALEXIN 250 MG/1
250 CAPSULE ORAL 2 TIMES DAILY
Qty: 4 CAPSULE | Refills: 0 | Status: SHIPPED | OUTPATIENT
Start: 2025-05-12 | End: 2025-05-14

## 2025-05-12 RX ADMIN — LIDOCAINE HYDROCHLORIDE 1 APPLICATION: 20 JELLY TOPICAL at 15:11

## 2025-05-12 NOTE — LETTER
"May 13, 2025     Oni Roy DO  [address] [facility] [address]    Patient: Cyrus Jovel   YOB: 1950   Date of Visit: 5/12/2025       Dear Dr. Oni Roy DO:    Thank you for referring Cyrus Jovel to me for evaluation. Below are my notes for this consultation.  If you have questions, please do not hesitate to call me. I look forward to following your patient along with you.       Sincerely,     Sudheer Matthew MD      CC: No Recipients  ______________________________________________________________________________________    Patient ID: Andi Jovel \"Chase" is a 74 y.o. male. PRESENTS FOR A SURVEILLANCE CYSTO IN F/U ON HIS H/O BLADDER CANCER.       PROCEDURE: CYSTOSCOPY   PRE OP:  H/O BLADDER CANCER  POST OP --RECURRENT TUMOR AT THE 10:00 POSITION OF THE BLADDER NECK  SURGEON -- MD MARCE  ANES:  1 % LIDOCAINE     FINDINGS:     After informed consent was obtained, the patient was taken to the procedure room for cystoscopy due to A H/O BLADDER CANCER.      Cystoscopy:     Procedure Note:    A sterile prep and drape was performed in standard fashion. Lidocaine was used for topical anesthesia. A flexible cystoscope was inserted into the urethra without difficulty revealing normal urethra.      The prostate obstructing.      Then entered the bladder revealing bladder mucosa with no erythematous patches or plaques, foreign bodies, OR  stones.  SMALL PAPILLARY LESION NOTED AT THE 10:00 POSITION OF THE BLADDER NECK. The ureteral orifices were visualized bilaterally. These were orthotopic in location and effluxing clear urine. No masses were seen on retroflexion.     Post-Procedure:   The cystoscope was removed. The vital signs were stable. The patient tolerated the procedure well. There were no complications.      URINALYSIS DIPSTICK--  WNL    PSA:  8/27/2024--3.46     Assessment/Plan  A:  RECURRENT UROTHELIAL CARCINOMA AT THE 10:00 POSITION OF THE BLADDER NECK ON TODAY'S CYSTO    PMH:  PT S/P , " BILATERAL RETROGRADE PYELOGRAMS, TUR OF A BLADDER TUMOR ON 10-22-24-- PATH REVEALS   FINAL DIAGNOSIS   Urinary bladder, tumor, transurethral resection:  --Fragments of noninvasive papillary urothelial carcinoma, with tumor grade heterogeneity (see note).     Note: Tumor grade heterogeneity is noted.  The tumor displays low-grade cytologic features, as well as high-grade cytologic features (5-10%).  There is no muscularis propria present for evaluation.     P:  SCHEDULE:  CYSTO, TURBT, OUT PT, GEN ANES.   KEFLEX 250 MG BID FOR 2 DAYS   5-12-25  OLE ZHENG MD  5-12-25  15:00

## 2025-05-16 ENCOUNTER — PREP FOR PROCEDURE (OUTPATIENT)
Dept: UROLOGY | Facility: CLINIC | Age: 75
End: 2025-05-16
Payer: MEDICARE

## 2025-05-16 DIAGNOSIS — C67.5: Primary | ICD-10-CM

## 2025-05-16 DIAGNOSIS — R82.3 HEMOGLOBINURIA: ICD-10-CM

## 2025-05-16 RX ORDER — CEFAZOLIN SODIUM 2 G/100ML
2 INJECTION, SOLUTION INTRAVENOUS ONCE
OUTPATIENT
Start: 2025-05-16 | End: 2025-05-16

## 2025-05-16 NOTE — H&P
"History Of Present Illness  Andi Jovel \"Cyrus\" is a 74 y.o. male presenting with a bladder tumor noted at the 10:00 position of the bladder neck on a recent out pt cysto..     Past Medical History  He has a past medical history of Arthritis, Diabetes 1.5, managed as type 2 (Multi), HL (hearing loss), Hyperlipidemia, Joint pain, and Nephrolithiasis.    Surgical History  He has a past surgical history that includes XR knee; Tonsillectomy; Adenoidectomy; Colonoscopy; and Meniscectomy.     Social History  He reports that he has been smoking cigarettes. He has never used smokeless tobacco. He reports that he does not currently use alcohol. He reports that he does not use drugs.    Family History  Family History[1]     Allergies  Patient has no known allergies.    Review of Systems     Physical Exam     Last Recorded Vitals  There were no vitals taken for this visit.    Relevant Results    No results found for this or any previous visit (from the past 24 hours).  Imaging  No results found.    Cardiology, Vascular, and Other Imaging  No other imaging results found for the past 7 days         Assessment/Plan   A:  PT FOUND TO HAVE A RECURRENT BLADDER TUMOR AT THE 1:00 POSITION OF THE BLADDER NECK  P{ SCHEDULE:  CYSTO, TURBT, OUT PT, GEN ANES     I spent 20 minutes in the professional and overall care of this patient.    Sudheer Matthew MD         [1]   Family History  Problem Relation Name Age of Onset    Heart disease Mother      Lung cancer Father       "

## 2025-05-20 ENCOUNTER — OFFICE VISIT (OUTPATIENT)
Dept: PRIMARY CARE | Facility: CLINIC | Age: 75
End: 2025-05-20
Payer: MEDICARE

## 2025-05-20 VITALS
OXYGEN SATURATION: 98 % | SYSTOLIC BLOOD PRESSURE: 124 MMHG | HEART RATE: 84 BPM | DIASTOLIC BLOOD PRESSURE: 66 MMHG | WEIGHT: 217 LBS | BODY MASS INDEX: 27.12 KG/M2

## 2025-05-20 DIAGNOSIS — M54.16 LUMBAR RADICULOPATHY: Primary | ICD-10-CM

## 2025-05-20 DIAGNOSIS — E11.9 TYPE 2 DIABETES MELLITUS WITHOUT COMPLICATION, UNSPECIFIED WHETHER LONG TERM INSULIN USE: ICD-10-CM

## 2025-05-20 LAB — HBA1C MFR BLD: 5.8 % (ref 4.2–6.5)

## 2025-05-20 PROCEDURE — 83036 HEMOGLOBIN GLYCOSYLATED A1C: CPT | Mod: CLIA WAIVED TEST | Performed by: STUDENT IN AN ORGANIZED HEALTH CARE EDUCATION/TRAINING PROGRAM

## 2025-05-20 PROCEDURE — 99214 OFFICE O/P EST MOD 30 MIN: CPT | Performed by: STUDENT IN AN ORGANIZED HEALTH CARE EDUCATION/TRAINING PROGRAM

## 2025-05-20 PROCEDURE — 3078F DIAST BP <80 MM HG: CPT | Performed by: STUDENT IN AN ORGANIZED HEALTH CARE EDUCATION/TRAINING PROGRAM

## 2025-05-20 PROCEDURE — 3074F SYST BP LT 130 MM HG: CPT | Performed by: STUDENT IN AN ORGANIZED HEALTH CARE EDUCATION/TRAINING PROGRAM

## 2025-05-20 PROCEDURE — 1123F ACP DISCUSS/DSCN MKR DOCD: CPT | Performed by: STUDENT IN AN ORGANIZED HEALTH CARE EDUCATION/TRAINING PROGRAM

## 2025-05-20 PROCEDURE — 4004F PT TOBACCO SCREEN RCVD TLK: CPT | Performed by: STUDENT IN AN ORGANIZED HEALTH CARE EDUCATION/TRAINING PROGRAM

## 2025-05-20 PROCEDURE — 3044F HG A1C LEVEL LT 7.0%: CPT | Performed by: STUDENT IN AN ORGANIZED HEALTH CARE EDUCATION/TRAINING PROGRAM

## 2025-05-20 PROCEDURE — 1159F MED LIST DOCD IN RCRD: CPT | Performed by: STUDENT IN AN ORGANIZED HEALTH CARE EDUCATION/TRAINING PROGRAM

## 2025-05-20 RX ORDER — METHYLPREDNISOLONE 4 MG/1
TABLET ORAL
Qty: 21 TABLET | Refills: 0 | Status: SHIPPED | OUTPATIENT
Start: 2025-05-20 | End: 2025-05-26

## 2025-05-20 RX ORDER — TRAMADOL HYDROCHLORIDE 50 MG/1
50 TABLET, FILM COATED ORAL EVERY 8 HOURS PRN
Qty: 30 TABLET | Refills: 0 | Status: SHIPPED | OUTPATIENT
Start: 2025-05-20 | End: 2025-05-30

## 2025-05-20 ASSESSMENT — PATIENT HEALTH QUESTIONNAIRE - PHQ9
2. FEELING DOWN, DEPRESSED OR HOPELESS: SEVERAL DAYS
10. IF YOU CHECKED OFF ANY PROBLEMS, HOW DIFFICULT HAVE THESE PROBLEMS MADE IT FOR YOU TO DO YOUR WORK, TAKE CARE OF THINGS AT HOME, OR GET ALONG WITH OTHER PEOPLE: SOMEWHAT DIFFICULT
1. LITTLE INTEREST OR PLEASURE IN DOING THINGS: NOT AT ALL
SUM OF ALL RESPONSES TO PHQ9 QUESTIONS 1 AND 2: 1

## 2025-05-20 ASSESSMENT — ENCOUNTER SYMPTOMS: DEPRESSION: 0

## 2025-05-20 NOTE — PROGRESS NOTES
Subjective   Patient ID: Cyrus Jovel is a 74 y.o. male who presents for Diabetes and Pain.    HPI     DM: well controlled on current meds with diabetic polyneuropathy on lyrica for which helps     HLD; stable at goal     chronic pain has been on lyrica and pain management that hs not helped has been to a foot doctor as well which makes it hard for him to walk. States his his fee over his back. Likely neuropathy 2n2 to dm vs back. I feel it coming from his back over his feet. Would suggest mri of spine and steroid backs     Stress; taking care of wife with recent amputation         Review of Systems   All other systems reviewed and are negative.      Objective   /66 (BP Location: Left arm, Patient Position: Sitting, BP Cuff Size: Large adult)   Pulse 84   Wt 98.4 kg (217 lb)   SpO2 98%   BMI 27.12 kg/m²     Physical Exam  Constitutional:       Appearance: Normal appearance.   HENT:      Head: Normocephalic and atraumatic.      Right Ear: Tympanic membrane and ear canal normal.      Left Ear: Tympanic membrane and ear canal normal.      Mouth/Throat:      Mouth: Mucous membranes are moist.      Pharynx: Oropharynx is clear.   Eyes:      Extraocular Movements: Extraocular movements intact.      Conjunctiva/sclera: Conjunctivae normal.      Pupils: Pupils are equal, round, and reactive to light.   Cardiovascular:      Rate and Rhythm: Normal rate and regular rhythm.      Pulses: Normal pulses.      Heart sounds: Normal heart sounds.   Pulmonary:      Effort: Pulmonary effort is normal.      Breath sounds: Normal breath sounds.   Abdominal:      General: Abdomen is flat. Bowel sounds are normal.      Palpations: Abdomen is soft.   Musculoskeletal:         General: Normal range of motion.      Cervical back: Normal range of motion and neck supple.   Skin:     General: Skin is warm and dry.      Capillary Refill: Capillary refill takes 2 to 3 seconds.   Neurological:      General: No focal deficit present.       Mental Status: He is alert and oriented to person, place, and time. Mental status is at baseline.   Psychiatric:         Mood and Affect: Mood normal.         Behavior: Behavior normal.         Thought Content: Thought content normal.         Judgment: Judgment normal.       Assessment/Plan   1. Type 2 diabetes mellitus without complication, unspecified whether long term insulin use  Sugar well contorled  - POCT Glycosylated Hemoglobin (HGB A1C) docked device    2. Lumbar radiculopathy (Primary)  - small dose tramadol to bridge mri ordered  Suggest surgery since nothing else is helping  - methylPREDNISolone (Medrol Dospak) 4 mg tablets; Take as directed on package.  Dispense: 21 tablet; Refill: 0  - MR lumbar spine wo IV contrast; Future  - MR foot right wo IV contrast; Future  - traMADol (Ultram) 50 mg tablet; Take 1 tablet (50 mg) by mouth every 8 hours if needed for severe pain (7 - 10) for up to 10 days.  Dispense: 30 tablet; Refill: 0  d

## 2025-05-28 ENCOUNTER — PRE-ADMISSION TESTING (OUTPATIENT)
Dept: PREADMISSION TESTING | Facility: HOSPITAL | Age: 75
End: 2025-05-28
Payer: MEDICARE

## 2025-05-28 VITALS
DIASTOLIC BLOOD PRESSURE: 59 MMHG | BODY MASS INDEX: 27.19 KG/M2 | TEMPERATURE: 97.3 F | SYSTOLIC BLOOD PRESSURE: 134 MMHG | HEIGHT: 75 IN | OXYGEN SATURATION: 98 % | HEART RATE: 58 BPM | WEIGHT: 218.7 LBS | RESPIRATION RATE: 16 BRPM

## 2025-05-28 DIAGNOSIS — R82.3 HEMOGLOBINURIA: ICD-10-CM

## 2025-05-28 DIAGNOSIS — C67.5: ICD-10-CM

## 2025-05-28 LAB
ANION GAP SERPL CALC-SCNC: 13 MMOL/L (ref 10–20)
APPEARANCE UR: CLEAR
BASOPHILS # BLD AUTO: 0.07 X10*3/UL (ref 0–0.1)
BASOPHILS NFR BLD AUTO: 1.1 %
BILIRUB UR STRIP.AUTO-MCNC: NEGATIVE MG/DL
BUN SERPL-MCNC: 22 MG/DL (ref 6–23)
CALCIUM SERPL-MCNC: 9.1 MG/DL (ref 8.6–10.3)
CHLORIDE SERPL-SCNC: 109 MMOL/L (ref 98–107)
CO2 SERPL-SCNC: 26 MMOL/L (ref 21–32)
COLOR UR: NORMAL
CREAT SERPL-MCNC: 1.68 MG/DL (ref 0.5–1.3)
EGFRCR SERPLBLD CKD-EPI 2021: 42 ML/MIN/1.73M*2
EOSINOPHIL # BLD AUTO: 0.24 X10*3/UL (ref 0–0.4)
EOSINOPHIL NFR BLD AUTO: 3.7 %
ERYTHROCYTE [DISTWIDTH] IN BLOOD BY AUTOMATED COUNT: 13.2 % (ref 11.5–14.5)
GLUCOSE SERPL-MCNC: 85 MG/DL (ref 74–99)
GLUCOSE UR STRIP.AUTO-MCNC: NORMAL MG/DL
HCT VFR BLD AUTO: 37.5 % (ref 41–52)
HGB BLD-MCNC: 11.8 G/DL (ref 13.5–17.5)
IMM GRANULOCYTES # BLD AUTO: 0.04 X10*3/UL (ref 0–0.5)
IMM GRANULOCYTES NFR BLD AUTO: 0.6 % (ref 0–0.9)
KETONES UR STRIP.AUTO-MCNC: NEGATIVE MG/DL
LEUKOCYTE ESTERASE UR QL STRIP.AUTO: NEGATIVE
LYMPHOCYTES # BLD AUTO: 2.12 X10*3/UL (ref 0.8–3)
LYMPHOCYTES NFR BLD AUTO: 32.4 %
MCH RBC QN AUTO: 29.3 PG (ref 26–34)
MCHC RBC AUTO-ENTMCNC: 31.5 G/DL (ref 32–36)
MCV RBC AUTO: 93 FL (ref 80–100)
MONOCYTES # BLD AUTO: 0.48 X10*3/UL (ref 0.05–0.8)
MONOCYTES NFR BLD AUTO: 7.3 %
NEUTROPHILS # BLD AUTO: 3.59 X10*3/UL (ref 1.6–5.5)
NEUTROPHILS NFR BLD AUTO: 54.9 %
NITRITE UR QL STRIP.AUTO: NEGATIVE
NRBC BLD-RTO: 0 /100 WBCS (ref 0–0)
PH UR STRIP.AUTO: 5.5 [PH]
PLATELET # BLD AUTO: 199 X10*3/UL (ref 150–450)
POTASSIUM SERPL-SCNC: 4.5 MMOL/L (ref 3.5–5.3)
PROT UR STRIP.AUTO-MCNC: NEGATIVE MG/DL
RBC # BLD AUTO: 4.03 X10*6/UL (ref 4.5–5.9)
RBC # UR STRIP.AUTO: NEGATIVE MG/DL
SODIUM SERPL-SCNC: 143 MMOL/L (ref 136–145)
SP GR UR STRIP.AUTO: 1.02
UROBILINOGEN UR STRIP.AUTO-MCNC: NORMAL MG/DL
WBC # BLD AUTO: 6.5 X10*3/UL (ref 4.4–11.3)

## 2025-05-28 PROCEDURE — 81003 URINALYSIS AUTO W/O SCOPE: CPT

## 2025-05-28 PROCEDURE — 99214 OFFICE O/P EST MOD 30 MIN: CPT | Performed by: NURSE PRACTITIONER

## 2025-05-28 PROCEDURE — 80048 BASIC METABOLIC PNL TOTAL CA: CPT | Performed by: UROLOGY

## 2025-05-28 PROCEDURE — 93005 ELECTROCARDIOGRAM TRACING: CPT

## 2025-05-28 PROCEDURE — 85025 COMPLETE CBC W/AUTO DIFF WBC: CPT | Performed by: UROLOGY

## 2025-05-28 RX ORDER — ACETAMINOPHEN 500 MG
6 TABLET ORAL NIGHTLY PRN
COMMUNITY

## 2025-05-28 ASSESSMENT — PAIN SCALES - GENERAL: PAINLEVEL_OUTOF10: 0 - NO PAIN

## 2025-05-28 ASSESSMENT — DUKE ACTIVITY SCORE INDEX (DASI)
CAN YOU PARTICIPATE IN STRENOUS SPORTS LIKE SWIMMING, SINGLES TENNIS, FOOTBALL, BASKETBALL, OR SKIING: NO
CAN YOU WALK A BLOCK OR TWO ON LEVEL GROUND: NO
TOTAL_SCORE: 33.95
DASI METS SCORE: 6.9
CAN YOU WALK INDOORS, SUCH AS AROUND YOUR HOUSE: YES
CAN YOU CLIMB A FLIGHT OF STAIRS OR WALK UP A HILL: YES
CAN YOU PARTICIPATE IN MODERATE RECREATIONAL ACTIVITIES LIKE GOLF, BOWLING, DANCING, DOUBLES TENNIS OR THROWING A BASEBALL OR FOOTBALL: NO
CAN YOU DO YARD WORK LIKE RAKING LEAVES, WEEDING OR PUSHING A MOWER: YES
CAN YOU DO MODERATE WORK AROUND THE HOUSE LIKE VACUUMING, SWEEPING FLOORS OR CARRYING GROCERIES: YES
CAN YOU TAKE CARE OF YOURSELF (EAT, DRESS, BATHE, OR USE TOILET): YES
CAN YOU RUN A SHORT DISTANCE: NO
CAN YOU DO LIGHT WORK AROUND THE HOUSE LIKE DUSTING OR WASHING DISHES: YES
CAN YOU DO HEAVY WORK AROUND THE HOUSE LIKE SCRUBBING FLOORS OR LIFTING AND MOVING HEAVY FURNITURE: YES
CAN YOU HAVE SEXUAL RELATIONS: YES

## 2025-05-28 ASSESSMENT — PAIN - FUNCTIONAL ASSESSMENT: PAIN_FUNCTIONAL_ASSESSMENT: 0-10

## 2025-05-28 NOTE — H&P (VIEW-ONLY)
"CPM/PAT Evaluation       Name: Andi Jovel (Andi Jovel \"Cyrus\")  /Age: 1950/74 y.o.     In-Person       Chief Complaint:     74 yr old male presents to [facility] for pre-operative evaluation, with c/o bladder tumor  CYSTOSCOPY/ LESION EXCISION  is Scheduled on 6/3/2025  with Dr. Matthew     The patient has the following past medical history:  DM, neuropathy, arthritis, HLD, nephrolithiasis, arrthymia, BPH, smoker    Chief complaint:  He states he was found to have sudden onset bloody urine last year, 10/2024-and found to have bladder cancer.  He followed up with Dr. Matthew/ previously managed kidney stones.  He had a cysto with resection of bladder tumor     Surveillance cysto revealed recurrent tumor  He endorses + nocturia,  urgency  Denies recent or current hematuria  Denies urinary dysuria, frequency, or hesitancy     PCP Dr. Roy 2025    Denies fever, chills or nausea.   Denies any past issues with anesthesia.        Vitals:    25 1401   BP: 134/59   Pulse: 58   Resp: 16   Temp: 36.3 °C (97.3 °F)   SpO2: 98%          Medical History[1]    Surgical History[2]    Patient  has no history on file for sexual activity.    Family History[3]    Allergies[4]    Prior to Admission medications    Medication Sig Start Date End Date Taking? Authorizing Provider   atorvastatin (Lipitor) 80 mg tablet TAKE 1 TABLET BY MOUTH ONCE DAILY. 25   Oni Roy,    cetirizine (ZyrTEC) 10 mg tablet Take 1 tablet (10 mg) by mouth once daily.    Historical Provider, MD   diazePAM (Valium) 10 mg tablet TAKE ONE HOUR PRIOR TO THE PROCEDURE  Patient not taking: Reported on 2025   Sudheer Matthew MD   diazePAM (Valium) 10 mg tablet Take 1 tablet (10 mg) by mouth every 8 hours if needed for anxiety for up to 7 days. 25  Sudheer Matthew MD   DULoxetine (Cymbalta) 20 mg DR capsule Take 1 capsule (20 mg) by mouth once daily at bedtime. Do not crush or chew. 25  Buster Hoawrd MD "   glimepiride (Amaryl) 2 mg tablet TAKE 1 TABLET BY MOUTH ONCE DAILY. 2/11/25   Oni Ice, DO   hydrocortisone 2.5 % cream Apply topically 2 times a day. to affected area 3/25/25   Historical Provider, MD   ibuprofen 600 mg tablet Take 1 tablet (600 mg) by mouth every 8 hours if needed for pain. 5/7/24   Historical Provider, MD   Januvia 50 mg tablet TAKE 1 TABLET BY MOUTH EVERY DAY 2/12/25   Oni Ice, DO   methylPREDNISolone (Medrol Dospak) 4 mg tablets Take as directed on package. 5/20/25 5/26/25  Oni Ice, DO   multivitamin tablet Take 1 tablet by mouth once daily.    Historical Provider, MD   pioglitazone (Actos) 15 mg tablet Take 1 tablet (15 mg) by mouth once daily. 8/27/24   Oni Ice, DO   pregabalin (Lyrica) 150 mg capsule TAKE 1 CAPSULE (150 MG) BY MOUTH 2 TIMES A DAY. 4/29/25   Oni Ice, DO   tamsulosin (Flomax) 0.4 mg 24 hr capsule Take 1 capsule (0.4 mg) by mouth 2 times a day.    Historical Provider, MD   traMADol (Ultram) 50 mg tablet Take 1 tablet (50 mg) by mouth every 8 hours if needed for severe pain (7 - 10) for up to 10 days. 5/20/25 5/30/25  Oni Ice, DO        Review of Systems  Constitutional: NO F, chills, or sweats  Eyes: no blurred vision or visual disturbance  ENT: denies congestion, sore throat, difficulty hearing  Cardiovascular: no chest pain, no edema, no palps and no syncope.   Respiratory: no cough,no s.o.b. and no wheezing  Gastrointestinal: no abdominal pain, no N/V, no blood in stools  Genitourinary: see HPI  Musculoskeletal: neuropathy, bilateral feet and back pain, right shoulder pain d/t old rotator cuff tear  Integumentary: no new skin lesions and no rashes.   Neurological: + difficulty walking d/t neuropathy/  falls,  no headache, no limb weakness  Endocrine: no recent weight gain and no recent weight loss.   Hematologic/Lymphatic: no tendency for easy bruising and no swollen glands.      Physical Exam  Constitutional:       Appearance: Normal appearance.  "  Cardiovascular:      Rate and Rhythm: Normal rate.      Heart sounds: Normal heart sounds.   Pulmonary:      Effort: Pulmonary effort is normal.      Breath sounds: Normal breath sounds.   Abdominal:      General: Bowel sounds are normal.      Palpations: Abdomen is soft.   Musculoskeletal:         General: Normal range of motion.      Cervical back: Normal range of motion.      Right lower leg: No edema.      Left lower leg: No edema.   Skin:     General: Skin is warm and dry.   Neurological:      Mental Status: He is alert and oriented to person, place, and time.   Psychiatric:         Cognition and Memory: Memory is impaired.          PAT AIRWAY:   Airway:     Mallampati::  II    TM distance::  <3 FB    Neck ROM::  Full  normal        Visit Vitals  /59   Pulse 58   Temp 36.3 °C (97.3 °F) (Tympanic)   Resp 16   Ht 1.905 m (6' 3\")   Wt 99.2 kg (218 lb 11.1 oz)   SpO2 98%   BMI 27.34 kg/m²   Smoking Status Former   BSA 2.29 m²       DASI Risk Score      Flowsheet Row Pre-Admission Testing from 5/28/2025 in [facility] Questionnaire Series Submission from 5/20/2025 in [facility] OR with Generic Provider Nino   Can you take care of yourself (eat, dress, bathe, or use toilet)?  2.75 filed at 05/28/2025 1350 2.75  filed at 05/20/2025 2046   Can you walk indoors, such as around your house? 1.75 filed at 05/28/2025 1350 1.75  filed at 05/20/2025 2046   Can you walk a block or two on level ground?  0 filed at 05/28/2025 1350 0  filed at 05/20/2025 2046   Can you climb a flight of stairs or walk up a hill? 5.5 filed at 05/28/2025 1350 5.5  filed at 05/20/2025 2046   Can you run a short distance? 0 filed at 05/28/2025 1350 0  filed at 05/20/2025 2046   Can you do light work around the house like dusting or washing dishes? 2.7 filed at 05/28/2025 1350 2.7  filed at 05/20/2025 2046   Can you do moderate work around the house like vacuuming, sweeping floors or carrying groceries? 3.5 filed at " 05/28/2025 1350 3.5  filed at 05/20/2025 2046   Can you do heavy work around the house like scrubbing floors or lifting and moving heavy furniture?  8 filed at 05/28/2025 1350 0  filed at 05/20/2025 2046   Can you do yard work like raking leaves, weeding or pushing a mower? 4.5 filed at 05/28/2025 1350 0  filed at 05/20/2025 2046   Can you have sexual relations? 5.25 filed at 05/28/2025 1350 0  filed at 05/20/2025 2046   Can you participate in moderate recreational activities like golf, bowling, dancing, doubles tennis or throwing a baseball or football? 0 filed at 05/28/2025 1350 0  filed at 05/20/2025 2046   Can you participate in strenous sports like swimming, singles tennis, football, basketball, or skiing? 0 filed at 05/28/2025 1350 0  filed at 05/20/2025 2046   DASI SCORE 33.95 filed at 05/28/2025 1350 16.2  filed at 05/20/2025 2046   METS Score (Will be calculated only when all the questions are answered) 6.9 filed at 05/28/2025 1350 4.7  filed at 05/20/2025 2046          Caprini DVT Assessment    No data to display       Modified Frailty Index    No data to display       [id number] Stroke Risk Points  Current as of just now        N/A 0 to 9 Points:      Last Change: N/A          The [id number] risk score (Lip REINIER, et al. 2009. © 2010 [employer]) quantifies the risk of stroke for a patient with atrial fibrillation. For patients without atrial fibrillation or under the age of 18 this score appears as N/A. Higher score values generally indicate higher risk of stroke.        This score is not applicable to this patient. Components are not calculated.          Revised Cardiac Risk Index    No data to display       Apfel Simplified Score    No data to display       Risk Analysis Index Results This Encounter    No data found in the last 10 encounters.       Stop Bang Score      Flowsheet Row Pre-Admission Testing from 5/28/2025 in [facility] Questionnaire Series Submission  from 5/20/2025 in [facility] OR with Generic Provider Nino   Do you snore loudly? 1 filed at 05/28/2025 1415 0  filed at 05/20/2025 2046   Do you often feel tired or fatigued after your sleep? 0 filed at 05/28/2025 1415 0  filed at 05/20/2025 2046   Has anyone ever observed you stop breathing in your sleep? 0 filed at 05/28/2025 1415 0  filed at 05/20/2025 2046   Do you have or are you being treated for high blood pressure? 0 filed at 05/28/2025 1415 0  filed at 05/20/2025 2046   Recent BMI (Calculated) 27.3 filed at 05/28/2025 1415 28.4  filed at 05/20/2025 2046   Is BMI greater than 35 kg/m2? 0=No filed at 05/28/2025 1415 0=No  filed at 05/20/2025 2046   Age older than 50 years old? 1=Yes filed at 05/28/2025 1415 1=Yes  filed at 05/20/2025 2046   Is your neck circumference greater than 17 inches (Male) or 16 inches (Female)? 0 filed at 05/28/2025 1415 --   Gender - Male 1=Yes filed at 05/28/2025 1415 1=Yes  filed at 05/20/2025 2046   STOP-BANG Total Score 3 filed at 05/28/2025 1415 --          Prodigy: High Risk  Total Score: 23              Prodigy Age Score      Prodigy Gender Score     Prodigy Previous Opioid Use Score           ARISCAT Score for Postoperative Pulmonary Complications    No data to display       Davis Perioperative Risk for Myocardial Infarction or Cardiac Arrest (RAMY)    No data to display         ASSESSMENT    HLD/arrhythmia  Takes Atorvastatin  ECG 5/28/2025- sinus bradycardia 1st degree AVB, PAC's, RBBB, LAFB, 57 bpm     DM with neuropathy  Takes Glimepiride, Januvia, Pioglitazone  A1c POCT,  5/20/2025- 5.8    Bladder cancer  S/p cysto/ resection of bladder tumor  Plan for cysto/excision of tissue as scheduled     ANESTHESIA FINDINGS:  Intubation History: No history of difficult intubation  Significant Anesthesia Considerations:      Airway History: No abnormal airway history      CONSULTS:    Patient does not require consults for optimization at this time.     The Following  Tests/Procedures Have Been Initiated and Reviewed/ interpreted by me:   CBC, BMP, U/A with reflex C/S,  ECG     Planned Anesthetic: general     Instructions Given to Patient:  *Reviewed Medications to be taken in AM of surgery or held  Patient given verbal and written preop instructions and voices comprehension and compliance.     No further testing required.      PLAN  This patient is optimally prepared for surgery.             [1]   Past Medical History:  Diagnosis Date    Arthritis     HL (hearing loss)     Hyperlipidemia     Joint pain     Nephrolithiasis     Type 2 diabetes mellitus    [2]   Past Surgical History:  Procedure Laterality Date    ADENOIDECTOMY      COLONOSCOPY      CYSTOSCOPY      KNEE      KNEE ARTHROPLASTY      MENISCECTOMY      TONSILLECTOMY      VASECTOMY     [3]   Family History  Problem Relation Name Age of Onset    Heart disease Mother      Lung cancer Father     [4] No Known Allergies

## 2025-05-28 NOTE — PREPROCEDURE INSTRUCTIONS
Medication List            Accurate as of May 28, 2025  2:00 PM. Always use your most recent med list.                atorvastatin 80 mg tablet  Commonly known as: Lipitor  TAKE 1 TABLET BY MOUTH ONCE DAILY.  Medication Adjustments for Surgery: Take on the morning of surgery     cetirizine 10 mg tablet  Commonly known as: ZyrTEC  Medication Adjustments for Surgery: Do Not take on the morning of surgery     * diazePAM 10 mg tablet  Commonly known as: Valium  TAKE ONE HOUR PRIOR TO THE PROCEDURE  Medication Adjustments for Surgery: Take/Use as prescribed     * diazePAM 10 mg tablet  Commonly known as: Valium  Take 1 tablet (10 mg) by mouth every 8 hours if needed for anxiety for up to 7 days.  Medication Adjustments for Surgery: Take/Use as prescribed     DULoxetine 20 mg DR capsule  Commonly known as: Cymbalta  Take 1 capsule (20 mg) by mouth once daily at bedtime. Do not crush or chew.  Medication Adjustments for Surgery: Take on the morning of surgery     glimepiride 2 mg tablet  Commonly known as: Amaryl  TAKE 1 TABLET BY MOUTH ONCE DAILY.  Medication Adjustments for Surgery: Take last dose 1 day (24 hours) before surgery  Notes to patient: Hold the night before and the day of surgery= Hold x 24 hours     hydrocortisone 2.5 % cream  Medication Adjustments for Surgery: Do Not take on the morning of surgery     ibuprofen 600 mg tablet  Additional Medication Adjustments for Surgery: Take last dose 7 days before surgery  Notes to patient: Stop now     Januvia 50 mg tablet  Generic drug: SITagliptin phosphate  TAKE 1 TABLET BY MOUTH EVERY DAY  Medication Adjustments for Surgery: Take last dose 1 day (24 hours) before surgery  Notes to patient: Hold the night before and the day of surgery= Hold x 24 hours     multivitamin tablet  Additional Medication Adjustments for Surgery: Take last dose 7 days before surgery  Notes to patient: Stop now     pioglitazone 15 mg tablet  Commonly known as: Actos  Take 1 tablet (15 mg)  by mouth once daily.  Medication Adjustments for Surgery: Do Not take on the morning of surgery  Notes to patient: If you take in the evening, ok to take with dinner, the night before surgery, 6/2     pregabalin 150 mg capsule  Commonly known as: Lyrica  TAKE 1 CAPSULE (150 MG) BY MOUTH 2 TIMES A DAY.     tamsulosin 0.4 mg 24 hr capsule  Commonly known as: Flomax  Medication Adjustments for Surgery: Take/Use as prescribed     traMADol 50 mg tablet  Commonly known as: Ultram  Take 1 tablet (50 mg) by mouth every 8 hours if needed for severe pain (7 - 10) for up to 10 days.  Medication Adjustments for Surgery: Take/Use as prescribed           * This list has 2 medication(s) that are the same as other medications prescribed for you. Read the directions carefully, and ask your doctor or other care provider to review them with you.                                  NPO Instructions:    Do not eat any food after midnight the night before your surgery/procedure.  You may have clear liquids until TWO hours before surgery/procedure. This includes water, black tea/coffee, (no milk or cream) apple juice and electrolyte drinks (Gatorade).  You may chew gum up to TWO hours before your surgery/procedure.    Additional Instructions:     Day of Surgery:  Review your medication instructions, take indicated medications  You may have clear liquids until TWO hours before surgery/procedure.  This includes water, black tea/coffee, (no milk or cream) apple juice and electrolyte drinks (Gatorade)  You may chew gum up to TWO hours before your surgery/procedure  Wear  comfortable loose fitting clothing  Do not use moisturizers, creams, lotions or perfume            PRE-OPERATIVE INSTRUCTIONS FOR SURGERY    *Do not eat anything after midnight the night of surgery.  This includes food of any kind (including hard candy, cough drops, mints).     You may have up to 13 ounces of clear liquid until TWO hours prior to your scheduled arrival  time  Clear liquids include water, black tea/coffee, (no milk or cream) apple juice and electrolyte drinks (GATORADE).  You may chew gum until TWO hours prior you your surgery/procedure.         -----------    *One of our staff members will call you ONE business day before your surgery, between 11am-2 pm to let you know the time to arrive.  If you have not received a call by 2 pm, call [phone]    *When you arrive at the hospital-->GO TO Registration on the ground floor  *Stop smoking 24 hours prior to surgery.  No Marijuana, CBD Oil or Vaping for 48 hours  *No alcohol 24 hours prior to surgery  *You will need a responsible adult to drive you home  -No acrylic nails or nail polish on at least one fingernail, NO polish on toes for foot surgery  -You may be asked to remove your dentures, partial plate, eyeglasses or contact lenses before going to surgery.  Please bring a case for these items.  -Body piercings need to be removed.  Jewelry and valuables should be left at home.  -Put on loose,  comfortable, clean clothing, that will accommodate bandages    *If you have any further questions about your pre-op instructions,  not mentioned in this handout, then call [phone]*    What you may be asked to bring to surgery:  ___Crutches, walker  ___CPAP machine  ___Urine specimen

## 2025-05-28 NOTE — PREPROCEDURE INSTRUCTIONS
Medication List            Accurate as of May 28, 2025  2:35 PM. Always use your most recent med list.                atorvastatin 80 mg tablet  Commonly known as: Lipitor  TAKE 1 TABLET BY MOUTH ONCE DAILY.  Medication Adjustments for Surgery: Take on the morning of surgery     cetirizine 10 mg tablet  Commonly known as: ZyrTEC  Medication Adjustments for Surgery: Do Not take on the morning of surgery     * diazePAM 10 mg tablet  Commonly known as: Valium  TAKE ONE HOUR PRIOR TO THE PROCEDURE  Medication Adjustments for Surgery: Take/Use as prescribed     * diazePAM 10 mg tablet  Commonly known as: Valium  Take 1 tablet (10 mg) by mouth every 8 hours if needed for anxiety for up to 7 days.  Medication Adjustments for Surgery: Take/Use as prescribed     diphenhydrAMINE-acetaminophen  mg per tablet  Commonly known as: Tylenol PM  Medication Adjustments for Surgery: Take/Use as prescribed     DULoxetine 20 mg DR capsule  Commonly known as: Cymbalta  Take 1 capsule (20 mg) by mouth once daily at bedtime. Do not crush or chew.  Medication Adjustments for Surgery: Take/Use as prescribed     glimepiride 2 mg tablet  Commonly known as: Amaryl  TAKE 1 TABLET BY MOUTH ONCE DAILY.  Medication Adjustments for Surgery: Take last dose 1 day (24 hours) before surgery  Notes to patient: Hold the night before and the day of surgery= Hold x 24 hours     hydrocortisone 2.5 % cream  Medication Adjustments for Surgery: Do Not take on the morning of surgery     ibuprofen 600 mg tablet  Additional Medication Adjustments for Surgery: Take last dose 7 days before surgery  Notes to patient: Stop now     Januvia 50 mg tablet  Generic drug: SITagliptin phosphate  TAKE 1 TABLET BY MOUTH EVERY DAY  Medication Adjustments for Surgery: Take last dose 1 day (24 hours) before surgery  Notes to patient: Hold the night before 6/2  and the day of surgery 6/3 = Hold x 24 hours     melatonin 5 mg tablet  Medication Adjustments for Surgery:  Take/Use as prescribed     multivitamin tablet  Additional Medication Adjustments for Surgery: Take last dose 7 days before surgery  Notes to patient: Stop now     pioglitazone 15 mg tablet  Commonly known as: Actos  Take 1 tablet (15 mg) by mouth once daily.  Medication Adjustments for Surgery: Do Not take on the morning of surgery  Notes to patient: If you take in the evening, ok to take with dinner, the night before surgery, 6/2     pregabalin 150 mg capsule  Commonly known as: Lyrica  TAKE 1 CAPSULE (150 MG) BY MOUTH 2 TIMES A DAY.  Medication Adjustments for Surgery: Take/Use as prescribed     tamsulosin 0.4 mg 24 hr capsule  Commonly known as: Flomax  Medication Adjustments for Surgery: Take/Use as prescribed     traMADol 50 mg tablet  Commonly known as: Ultram  Take 1 tablet (50 mg) by mouth every 8 hours if needed for severe pain (7 - 10) for up to 10 days.  Medication Adjustments for Surgery: Take/Use as prescribed           * This list has 2 medication(s) that are the same as other medications prescribed for you. Read the directions carefully, and ask your doctor or other care provider to review them with you.                  PRE-OPERATIVE INSTRUCTIONS FOR SURGERY    *Do not eat anything after midnight the night of surgery.  This includes food of any kind (including hard candy, cough drops, mints).     You may have up to 13 ounces of clear liquid until TWO hours prior to your scheduled arrival time  Clear liquids include water, black tea/coffee, (no milk or cream) apple juice and electrolyte drinks (GATORADE).  You may chew gum until TWO hours prior you your surgery/procedure.         -----------    *One of our staff members will call you ONE business day before your surgery, between 11am-2 pm to let you know the time to arrive.    If you have not received a call by 2 pm, call [phone]    *When you arrive at the hospital-->GO TO Registration on the ground floor    *Stop smoking 24 hours prior to  surgery.  No Marijuana, CBD Oil or Vaping for 48 hours    *No alcohol 24 hours prior to surgery    *You will need a responsible adult to drive you home      -You may be asked to remove your dentures, partial plate, eyeglasses or contact   lenses before going to surgery.  Please bring a case for these items.    -Body piercings need to be removed.  Jewelry and valuables should be left at home.    -Put on loose,  comfortable, clean clothing, that will accommodate bandages    *If you have any further questions about your pre-op instructions,  not mentioned in this handout, then call [phone]*    What you may be asked to bring to surgery:  Insurance info and photo ID                           NPO Instructions:    Do not eat any food after midnight the night before your surgery/procedure.  You may have clear liquids until TWO hours before surgery/procedure. This includes water, black tea/coffee, (no milk or cream) apple juice and electrolyte drinks (Gatorade).    Additional Instructions:     Day of Surgery:  Review your medication instructions, take indicated medications  You may have clear liquids until TWO hours before surgery/procedure.  This includes water, black tea/coffee, (no milk or cream) apple juice and electrolyte drinks (Gatorade)  Wear  comfortable loose fitting clothing  Do not use moisturizers, creams, lotions or perfume  All jewelry and valuables should be left at home

## 2025-05-28 NOTE — CPM/PAT H&P
"CPM/PAT Evaluation       Name: Andi Jovel (Andi Jovel \"Cyrus\")  /Age: 1950/74 y.o.     In-Person       Chief Complaint:     74 yr old male presents to [facility] for pre-operative evaluation, with c/o bladder tumor  CYSTOSCOPY/ LESION EXCISION  is Scheduled on 6/3/2025  with Dr. Matthew     The patient has the following past medical history:  DM, neuropathy, arthritis, HLD, nephrolithiasis, arrthymia, BPH, smoker    Chief complaint:  He states he was found to have sudden onset bloody urine last year, 10/2024-and found to have bladder cancer.  He followed up with Dr. Matthew/ previously managed kidney stones.  He had a cysto with resection of bladder tumor     Surveillance cysto revealed recurrent tumor  He endorses + nocturia,  urgency  Denies recent or current hematuria  Denies urinary dysuria, frequency, or hesitancy     PCP Dr. Roy 2025    Denies fever, chills or nausea.   Denies any past issues with anesthesia.        Vitals:    25 1401   BP: 134/59   Pulse: 58   Resp: 16   Temp: 36.3 °C (97.3 °F)   SpO2: 98%          Medical History[1]    Surgical History[2]    Patient  has no history on file for sexual activity.    Family History[3]    Allergies[4]    Prior to Admission medications    Medication Sig Start Date End Date Taking? Authorizing Provider   atorvastatin (Lipitor) 80 mg tablet TAKE 1 TABLET BY MOUTH ONCE DAILY. 25   Oni Roy,    cetirizine (ZyrTEC) 10 mg tablet Take 1 tablet (10 mg) by mouth once daily.    Historical Provider, MD   diazePAM (Valium) 10 mg tablet TAKE ONE HOUR PRIOR TO THE PROCEDURE  Patient not taking: Reported on 2025   Sudheer Matthew MD   diazePAM (Valium) 10 mg tablet Take 1 tablet (10 mg) by mouth every 8 hours if needed for anxiety for up to 7 days. 25  Sudheer Matthew MD   DULoxetine (Cymbalta) 20 mg DR capsule Take 1 capsule (20 mg) by mouth once daily at bedtime. Do not crush or chew. 25  Buster Howard MD "   glimepiride (Amaryl) 2 mg tablet TAKE 1 TABLET BY MOUTH ONCE DAILY. 2/11/25   Oni Ice, DO   hydrocortisone 2.5 % cream Apply topically 2 times a day. to affected area 3/25/25   Historical Provider, MD   ibuprofen 600 mg tablet Take 1 tablet (600 mg) by mouth every 8 hours if needed for pain. 5/7/24   Historical Provider, MD   Januvia 50 mg tablet TAKE 1 TABLET BY MOUTH EVERY DAY 2/12/25   Oni Ice, DO   methylPREDNISolone (Medrol Dospak) 4 mg tablets Take as directed on package. 5/20/25 5/26/25  Oni Ice, DO   multivitamin tablet Take 1 tablet by mouth once daily.    Historical Provider, MD   pioglitazone (Actos) 15 mg tablet Take 1 tablet (15 mg) by mouth once daily. 8/27/24   Oni Ice, DO   pregabalin (Lyrica) 150 mg capsule TAKE 1 CAPSULE (150 MG) BY MOUTH 2 TIMES A DAY. 4/29/25   Oni Ice, DO   tamsulosin (Flomax) 0.4 mg 24 hr capsule Take 1 capsule (0.4 mg) by mouth 2 times a day.    Historical Provider, MD   traMADol (Ultram) 50 mg tablet Take 1 tablet (50 mg) by mouth every 8 hours if needed for severe pain (7 - 10) for up to 10 days. 5/20/25 5/30/25  Oni Ice, DO        Review of Systems  Constitutional: NO F, chills, or sweats  Eyes: no blurred vision or visual disturbance  ENT: denies congestion, sore throat, difficulty hearing  Cardiovascular: no chest pain, no edema, no palps and no syncope.   Respiratory: no cough,no s.o.b. and no wheezing  Gastrointestinal: no abdominal pain, no N/V, no blood in stools  Genitourinary: see HPI  Musculoskeletal: neuropathy, bilateral feet and back pain, right shoulder pain d/t old rotator cuff tear  Integumentary: no new skin lesions and no rashes.   Neurological: + difficulty walking d/t neuropathy/  falls,  no headache, no limb weakness  Endocrine: no recent weight gain and no recent weight loss.   Hematologic/Lymphatic: no tendency for easy bruising and no swollen glands.      Physical Exam  Constitutional:       Appearance: Normal appearance.  "  Cardiovascular:      Rate and Rhythm: Normal rate.      Heart sounds: Normal heart sounds.   Pulmonary:      Effort: Pulmonary effort is normal.      Breath sounds: Normal breath sounds.   Abdominal:      General: Bowel sounds are normal.      Palpations: Abdomen is soft.   Musculoskeletal:         General: Normal range of motion.      Cervical back: Normal range of motion.      Right lower leg: No edema.      Left lower leg: No edema.   Skin:     General: Skin is warm and dry.   Neurological:      Mental Status: He is alert and oriented to person, place, and time.   Psychiatric:         Cognition and Memory: Memory is impaired.          PAT AIRWAY:   Airway:     Mallampati::  II    TM distance::  <3 FB    Neck ROM::  Full  normal        Visit Vitals  /59   Pulse 58   Temp 36.3 °C (97.3 °F) (Tympanic)   Resp 16   Ht 1.905 m (6' 3\")   Wt 99.2 kg (218 lb 11.1 oz)   SpO2 98%   BMI 27.34 kg/m²   Smoking Status Former   BSA 2.29 m²       DASI Risk Score      Flowsheet Row Pre-Admission Testing from 5/28/2025 in [facility] Questionnaire Series Submission from 5/20/2025 in [facility] OR with Generic Provider Nino   Can you take care of yourself (eat, dress, bathe, or use toilet)?  2.75 filed at 05/28/2025 1350 2.75  filed at 05/20/2025 2046   Can you walk indoors, such as around your house? 1.75 filed at 05/28/2025 1350 1.75  filed at 05/20/2025 2046   Can you walk a block or two on level ground?  0 filed at 05/28/2025 1350 0  filed at 05/20/2025 2046   Can you climb a flight of stairs or walk up a hill? 5.5 filed at 05/28/2025 1350 5.5  filed at 05/20/2025 2046   Can you run a short distance? 0 filed at 05/28/2025 1350 0  filed at 05/20/2025 2046   Can you do light work around the house like dusting or washing dishes? 2.7 filed at 05/28/2025 1350 2.7  filed at 05/20/2025 2046   Can you do moderate work around the house like vacuuming, sweeping floors or carrying groceries? 3.5 filed at " 05/28/2025 1350 3.5  filed at 05/20/2025 2046   Can you do heavy work around the house like scrubbing floors or lifting and moving heavy furniture?  8 filed at 05/28/2025 1350 0  filed at 05/20/2025 2046   Can you do yard work like raking leaves, weeding or pushing a mower? 4.5 filed at 05/28/2025 1350 0  filed at 05/20/2025 2046   Can you have sexual relations? 5.25 filed at 05/28/2025 1350 0  filed at 05/20/2025 2046   Can you participate in moderate recreational activities like golf, bowling, dancing, doubles tennis or throwing a baseball or football? 0 filed at 05/28/2025 1350 0  filed at 05/20/2025 2046   Can you participate in strenous sports like swimming, singles tennis, football, basketball, or skiing? 0 filed at 05/28/2025 1350 0  filed at 05/20/2025 2046   DASI SCORE 33.95 filed at 05/28/2025 1350 16.2  filed at 05/20/2025 2046   METS Score (Will be calculated only when all the questions are answered) 6.9 filed at 05/28/2025 1350 4.7  filed at 05/20/2025 2046          Caprini DVT Assessment    No data to display       Modified Frailty Index    No data to display       [id number] Stroke Risk Points  Current as of just now        N/A 0 to 9 Points:      Last Change: N/A          The DJC8OH9-YSSl risk score (Lip REINIER, et al. 2009. © 2010 American College [employer]) quantifies the risk of stroke for a patient with atrial fibrillation. For patients without atrial fibrillation or under the age of 18 this score appears as N/A. Higher score values generally indicate higher risk of stroke.        This score is not applicable to this patient. Components are not calculated.          Revised Cardiac Risk Index    No data to display       Apfel Simplified Score    No data to display       Risk Analysis Index Results This Encounter    No data found in the last 10 encounters.       Stop Bang Score      Flowsheet Row Pre-Admission Testing from 5/28/2025 in [facility] Questionnaire Series Submission  from 5/20/2025 in [facility] OR with Generic Provider Nino   Do you snore loudly? 1 filed at 05/28/2025 1415 0  filed at 05/20/2025 2046   Do you often feel tired or fatigued after your sleep? 0 filed at 05/28/2025 1415 0  filed at 05/20/2025 2046   Has anyone ever observed you stop breathing in your sleep? 0 filed at 05/28/2025 1415 0  filed at 05/20/2025 2046   Do you have or are you being treated for high blood pressure? 0 filed at 05/28/2025 1415 0  filed at 05/20/2025 2046   Recent BMI (Calculated) 27.3 filed at 05/28/2025 1415 28.4  filed at 05/20/2025 2046   Is BMI greater than 35 kg/m2? 0=No filed at 05/28/2025 1415 0=No  filed at 05/20/2025 2046   Age older than 50 years old? 1=Yes filed at 05/28/2025 1415 1=Yes  filed at 05/20/2025 2046   Is your neck circumference greater than 17 inches (Male) or 16 inches (Female)? 0 filed at 05/28/2025 1415 --   Gender - Male 1=Yes filed at 05/28/2025 1415 1=Yes  filed at 05/20/2025 2046   STOP-BANG Total Score 3 filed at 05/28/2025 1415 --          Prodigy: High Risk  Total Score: 23              Prodigy Age Score      Prodigy Gender Score     Prodigy Previous Opioid Use Score           ARISCAT Score for Postoperative Pulmonary Complications    No data to display       Davis Perioperative Risk for Myocardial Infarction or Cardiac Arrest (RAMY)    No data to display         ASSESSMENT    HLD/arrhythmia  Takes Atorvastatin  ECG 5/28/2025- sinus bradycardia 1st degree AVB, PAC's, RBBB, LAFB, 57 bpm     DM with neuropathy  Takes Glimepiride, Januvia, Pioglitazone  A1c POCT,  5/20/2025- 5.8    Bladder cancer  S/p cysto/ resection of bladder tumor  Plan for cysto/excision of tissue as scheduled     ANESTHESIA FINDINGS:  Intubation History: No history of difficult intubation  Significant Anesthesia Considerations:      Airway History: No abnormal airway history      CONSULTS:    Patient does not require consults for optimization at this time.     The Following  Tests/Procedures Have Been Initiated and Reviewed/ interpreted by me:   CBC, BMP, U/A with reflex C/S,  ECG     Planned Anesthetic: general     Instructions Given to Patient:  *Reviewed Medications to be taken in AM of surgery or held  Patient given verbal and written preop instructions and voices comprehension and compliance.     No further testing required.      PLAN  This patient is optimally prepared for surgery.             [1]   Past Medical History:  Diagnosis Date    Arthritis     HL (hearing loss)     Hyperlipidemia     Joint pain     Nephrolithiasis     Type 2 diabetes mellitus    [2]   Past Surgical History:  Procedure Laterality Date    ADENOIDECTOMY      COLONOSCOPY      CYSTOSCOPY      KNEE      KNEE ARTHROPLASTY      MENISCECTOMY      TONSILLECTOMY      VASECTOMY     [3]   Family History  Problem Relation Name Age of Onset    Heart disease Mother      Lung cancer Father     [4] No Known Allergies

## 2025-05-29 LAB
ATRIAL RATE: 57 BPM
HOLD SPECIMEN: NORMAL
P AXIS: 73 DEGREES
P OFFSET: 127 MS
P ONSET: 81 MS
PR INTERVAL: 260 MS
Q ONSET: 211 MS
QRS COUNT: 9 BEATS
QRS DURATION: 138 MS
QT INTERVAL: 484 MS
QTC CALCULATION(BAZETT): 471 MS
QTC FREDERICIA: 475 MS
R AXIS: -58 DEGREES
T AXIS: 48 DEGREES
T OFFSET: 453 MS
VENTRICULAR RATE: 57 BPM

## 2025-06-03 ENCOUNTER — HOSPITAL ENCOUNTER (OUTPATIENT)
Facility: HOSPITAL | Age: 75
Setting detail: OUTPATIENT SURGERY
Discharge: HOME | End: 2025-06-03
Attending: UROLOGY | Admitting: UROLOGY
Payer: MEDICARE

## 2025-06-03 ENCOUNTER — PHARMACY VISIT (OUTPATIENT)
Dept: PHARMACY | Facility: CLINIC | Age: 75
End: 2025-06-03
Payer: MEDICARE

## 2025-06-03 ENCOUNTER — ANESTHESIA (OUTPATIENT)
Dept: OPERATING ROOM | Facility: HOSPITAL | Age: 75
End: 2025-06-03
Payer: MEDICARE

## 2025-06-03 ENCOUNTER — ANESTHESIA EVENT (OUTPATIENT)
Dept: OPERATING ROOM | Facility: HOSPITAL | Age: 75
End: 2025-06-03
Payer: MEDICARE

## 2025-06-03 VITALS
SYSTOLIC BLOOD PRESSURE: 102 MMHG | HEART RATE: 47 BPM | RESPIRATION RATE: 17 BRPM | OXYGEN SATURATION: 99 % | TEMPERATURE: 98.4 F | DIASTOLIC BLOOD PRESSURE: 53 MMHG

## 2025-06-03 DIAGNOSIS — C67.5: Primary | ICD-10-CM

## 2025-06-03 LAB — GLUCOSE BLD MANUAL STRIP-MCNC: 112 MG/DL (ref 74–99)

## 2025-06-03 PROCEDURE — A52224 PR CYSTOURETHROSCOPY,FULGUR <0.5 CM LESN: Performed by: ANESTHESIOLOGIST ASSISTANT

## 2025-06-03 PROCEDURE — 2500000004 HC RX 250 GENERAL PHARMACY W/ HCPCS (ALT 636 FOR OP/ED): Performed by: ANESTHESIOLOGIST ASSISTANT

## 2025-06-03 PROCEDURE — 2500000004 HC RX 250 GENERAL PHARMACY W/ HCPCS (ALT 636 FOR OP/ED): Performed by: UROLOGY

## 2025-06-03 PROCEDURE — 7100000009 HC PHASE TWO TIME - INITIAL BASE CHARGE: Performed by: UROLOGY

## 2025-06-03 PROCEDURE — A52224 PR CYSTOURETHROSCOPY,FULGUR <0.5 CM LESN: Performed by: ANESTHESIOLOGY

## 2025-06-03 PROCEDURE — 3700000001 HC GENERAL ANESTHESIA TIME - INITIAL BASE CHARGE: Performed by: UROLOGY

## 2025-06-03 PROCEDURE — 7100000010 HC PHASE TWO TIME - EACH INCREMENTAL 1 MINUTE: Performed by: UROLOGY

## 2025-06-03 PROCEDURE — RXMED WILLOW AMBULATORY MEDICATION CHARGE

## 2025-06-03 PROCEDURE — 52224 CYSTOSCOPY AND TREATMENT: CPT | Performed by: UROLOGY

## 2025-06-03 PROCEDURE — 3600000008 HC OR TIME - EACH INCREMENTAL 1 MINUTE - PROCEDURE LEVEL THREE: Performed by: UROLOGY

## 2025-06-03 PROCEDURE — 99100 ANES PT EXTEME AGE<1 YR&>70: CPT | Performed by: ANESTHESIOLOGY

## 2025-06-03 PROCEDURE — 3600000003 HC OR TIME - INITIAL BASE CHARGE - PROCEDURE LEVEL THREE: Performed by: UROLOGY

## 2025-06-03 PROCEDURE — 3700000002 HC GENERAL ANESTHESIA TIME - EACH INCREMENTAL 1 MINUTE: Performed by: UROLOGY

## 2025-06-03 PROCEDURE — 7100000002 HC RECOVERY ROOM TIME - EACH INCREMENTAL 1 MINUTE: Performed by: UROLOGY

## 2025-06-03 PROCEDURE — 7100000001 HC RECOVERY ROOM TIME - INITIAL BASE CHARGE: Performed by: UROLOGY

## 2025-06-03 PROCEDURE — 2500000005 HC RX 250 GENERAL PHARMACY W/O HCPCS: Performed by: UROLOGY

## 2025-06-03 PROCEDURE — 82947 ASSAY GLUCOSE BLOOD QUANT: CPT

## 2025-06-03 RX ORDER — GLYCOPYRROLATE 0.2 MG/ML
INJECTION INTRAMUSCULAR; INTRAVENOUS AS NEEDED
Status: DISCONTINUED | OUTPATIENT
Start: 2025-06-03 | End: 2025-06-03

## 2025-06-03 RX ORDER — LIDOCAINE HCL/PF 100 MG/5ML
SYRINGE (ML) INTRAVENOUS AS NEEDED
Status: DISCONTINUED | OUTPATIENT
Start: 2025-06-03 | End: 2025-06-03

## 2025-06-03 RX ORDER — PROCHLORPERAZINE EDISYLATE 5 MG/ML
5 INJECTION INTRAMUSCULAR; INTRAVENOUS ONCE AS NEEDED
Status: DISCONTINUED | OUTPATIENT
Start: 2025-06-03 | End: 2025-06-03 | Stop reason: HOSPADM

## 2025-06-03 RX ORDER — CEFAZOLIN SODIUM 2 G/50ML
2 SOLUTION INTRAVENOUS ONCE
Status: COMPLETED | OUTPATIENT
Start: 2025-06-03 | End: 2025-06-03

## 2025-06-03 RX ORDER — ACETAMINOPHEN 325 MG/1
650 TABLET ORAL EVERY 4 HOURS PRN
Status: DISCONTINUED | OUTPATIENT
Start: 2025-06-03 | End: 2025-06-03 | Stop reason: HOSPADM

## 2025-06-03 RX ORDER — CEPHALEXIN 250 MG/1
250 CAPSULE ORAL 4 TIMES DAILY
Qty: 16 CAPSULE | Refills: 0 | Status: SHIPPED | OUTPATIENT
Start: 2025-06-03 | End: 2025-06-07

## 2025-06-03 RX ORDER — WATER 1 ML/ML
INJECTION IRRIGATION AS NEEDED
Status: DISCONTINUED | OUTPATIENT
Start: 2025-06-03 | End: 2025-06-03 | Stop reason: HOSPADM

## 2025-06-03 RX ORDER — ALBUTEROL SULFATE 0.83 MG/ML
2.5 SOLUTION RESPIRATORY (INHALATION) ONCE AS NEEDED
Status: DISCONTINUED | OUTPATIENT
Start: 2025-06-03 | End: 2025-06-03 | Stop reason: HOSPADM

## 2025-06-03 RX ORDER — LIDOCAINE HYDROCHLORIDE 10 MG/ML
0.1 INJECTION, SOLUTION INFILTRATION; PERINEURAL ONCE
Status: DISCONTINUED | OUTPATIENT
Start: 2025-06-03 | End: 2025-06-03 | Stop reason: HOSPADM

## 2025-06-03 RX ORDER — PROPOFOL 10 MG/ML
INJECTION, EMULSION INTRAVENOUS AS NEEDED
Status: DISCONTINUED | OUTPATIENT
Start: 2025-06-03 | End: 2025-06-03

## 2025-06-03 RX ORDER — FENTANYL CITRATE 50 UG/ML
INJECTION, SOLUTION INTRAMUSCULAR; INTRAVENOUS AS NEEDED
Status: DISCONTINUED | OUTPATIENT
Start: 2025-06-03 | End: 2025-06-03

## 2025-06-03 RX ORDER — SODIUM CHLORIDE 0.9 G/100ML
INJECTION, SOLUTION IRRIGATION AS NEEDED
Status: DISCONTINUED | OUTPATIENT
Start: 2025-06-03 | End: 2025-06-03 | Stop reason: HOSPADM

## 2025-06-03 RX ORDER — SODIUM CHLORIDE, SODIUM LACTATE, POTASSIUM CHLORIDE, CALCIUM CHLORIDE 600; 310; 30; 20 MG/100ML; MG/100ML; MG/100ML; MG/100ML
100 INJECTION, SOLUTION INTRAVENOUS CONTINUOUS
Status: DISCONTINUED | OUTPATIENT
Start: 2025-06-03 | End: 2025-06-03 | Stop reason: HOSPADM

## 2025-06-03 RX ORDER — ONDANSETRON HYDROCHLORIDE 2 MG/ML
INJECTION, SOLUTION INTRAVENOUS AS NEEDED
Status: DISCONTINUED | OUTPATIENT
Start: 2025-06-03 | End: 2025-06-03

## 2025-06-03 RX ORDER — ONDANSETRON HYDROCHLORIDE 2 MG/ML
4 INJECTION, SOLUTION INTRAVENOUS ONCE AS NEEDED
Status: DISCONTINUED | OUTPATIENT
Start: 2025-06-03 | End: 2025-06-03 | Stop reason: HOSPADM

## 2025-06-03 RX ORDER — IPRATROPIUM BROMIDE 0.5 MG/2.5ML
500 SOLUTION RESPIRATORY (INHALATION) ONCE
Status: DISCONTINUED | OUTPATIENT
Start: 2025-06-03 | End: 2025-06-03 | Stop reason: HOSPADM

## 2025-06-03 RX ADMIN — FENTANYL CITRATE 50 MCG: 50 INJECTION, SOLUTION INTRAMUSCULAR; INTRAVENOUS at 10:48

## 2025-06-03 RX ADMIN — CEFAZOLIN SODIUM 2 G: 2 SOLUTION INTRAVENOUS at 10:35

## 2025-06-03 RX ADMIN — SODIUM CHLORIDE, SODIUM LACTATE, POTASSIUM CHLORIDE, AND CALCIUM CHLORIDE: .6; .31; .03; .02 INJECTION, SOLUTION INTRAVENOUS at 10:25

## 2025-06-03 RX ADMIN — LIDOCAINE HYDROCHLORIDE 50 MG: 20 INJECTION INTRAVENOUS at 10:31

## 2025-06-03 RX ADMIN — GLYCOPYRROLATE 0.1 MG: 0.2 INJECTION, SOLUTION INTRAMUSCULAR; INTRAVENOUS at 10:35

## 2025-06-03 RX ADMIN — ONDANSETRON 4 MG: 2 INJECTION INTRAMUSCULAR; INTRAVENOUS at 10:31

## 2025-06-03 RX ADMIN — FENTANYL CITRATE 50 MCG: 50 INJECTION, SOLUTION INTRAMUSCULAR; INTRAVENOUS at 10:31

## 2025-06-03 RX ADMIN — PROPOFOL 200 MG: 10 INJECTION, EMULSION INTRAVENOUS at 10:31

## 2025-06-03 SDOH — HEALTH STABILITY: MENTAL HEALTH: CURRENT SMOKER: 0

## 2025-06-03 ASSESSMENT — COLUMBIA-SUICIDE SEVERITY RATING SCALE - C-SSRS
2. HAVE YOU ACTUALLY HAD ANY THOUGHTS OF KILLING YOURSELF?: NO
1. IN THE PAST MONTH, HAVE YOU WISHED YOU WERE DEAD OR WISHED YOU COULD GO TO SLEEP AND NOT WAKE UP?: NO
6. HAVE YOU EVER DONE ANYTHING, STARTED TO DO ANYTHING, OR PREPARED TO DO ANYTHING TO END YOUR LIFE?: NO
2. HAVE YOU ACTUALLY HAD ANY THOUGHTS OF KILLING YOURSELF?: NO
6. HAVE YOU EVER DONE ANYTHING, STARTED TO DO ANYTHING, OR PREPARED TO DO ANYTHING TO END YOUR LIFE?: NO

## 2025-06-03 ASSESSMENT — PAIN SCALES - GENERAL
PAINLEVEL_OUTOF10: 0 - NO PAIN
PAINLEVEL_OUTOF10: 0 - NO PAIN

## 2025-06-03 ASSESSMENT — PAIN - FUNCTIONAL ASSESSMENT
PAIN_FUNCTIONAL_ASSESSMENT: 0-10
PAIN_FUNCTIONAL_ASSESSMENT: 0-10

## 2025-06-03 NOTE — BRIEF OP NOTE
"Date: 6/3/2025  OR Location: [facility] OR    Name: Andi Jovel \"Chase", : 1950, Age: 74 y.o., MRN: [id number], Sex: male    Diagnosis  Pre-op Diagnosis      * Malignant tumor of bladder neck (Multi) [C67.5] Post-op Diagnosis     * Malignant tumor of bladder neck (Multi) [C67.5]     Procedures  CYSTOSCOPY/ LESION EXCISION  74095 - AL CYSTO W/REMOVAL OF LESIONS SMALL      Surgeons      * Sudheer Matthew - Primary    Resident/Fellow/Other Assistant:  Surgeons and Role:  * No surgeons found with a matching role *    Staff:   Circulator: Lindsay Thomas Person: José  Surgical Assistant: Juanita    Anesthesia Staff: Anesthesiologist: Johnson Diaz MD  C-AA: KURTIS Herrera    Procedure Summary  Anesthesia: General  ASA: III  Estimated Blood Loss: 0 mL  Intra-op Medications:   Administrations occurring from 1030 to 1145 on 25:   Medication Name Total Dose   sodium chloride 0.9 % irrigation solution 1,000 mL   sterile water irrigation solution 3,000 mL   ceFAZolin (Ancef) 2 g in dextrose (iso) IV 50 mL 2 g   fentaNYL (Sublimaze) injection 50 mcg/mL 100 mcg   glycopyrrolate (Robinul) injection 0.1 mg   LR bolus Cannot be calculated   lidocaine (cardiac) injection 2% prefilled syringe 50 mg   ondansetron (Zofran) 2 mg/mL injection 4 mg   propofol (Diprivan) injection 10 mg/mL 200 mg              Anesthesia Record               Intraprocedure I/O Totals          Intake    LR bolus 200.00 mL    Total Intake 200 mL          Specimen:   ID Type Source Tests Collected by Time   1 : RIGHT POSTERIOR BLADDER WALL Tissue BLADDER BIOPSY SURGICAL PATHOLOGY EXAM Sudheer Matthew MD 6/3/2025 1040   2 : BLADDER NECK Tissue BLADDER BIOPSY SURGICAL PATHOLOGY EXAM Sudheer Matthew MD 6/3/2025 1041                  Findings: This is a 74-year-old male who is well-known to the urology service because of a history of low-grade urothelial carcinoma of the urinary bladder.  A recent outpatient cystoscopy revealed a recurrent tumor at the 7 " o'clock position of the bladder neck and a questionable area of abnormality on the right posterior wall.  A recommendation for cystoscopy and bladder biopsy under general anesthesia was made.  He now comes into the holding area in preparation for this procedure.  While in the holding area a huddle was performed with the patient the operating room staff and myself and the patient has agreed that he is to undergo a cystoscopy bladder biopsy and fulguration under general anesthetic.  The patient was then brought into the operating room placed on the operating table and a second huddle was performed.  The operating room staff patient and myself all agreed that the patient was to undergo cystoscopy bladder biopsy and fulguration.  The patient was then placed under general anesthetic and positioned in the dorsal lithotomy position.  His perineum and genital area prepped and draped in usual manner.  A third huddle was performed and all agreed that the patient was to undergo cystoscopy, bladder biopsy and fulguration.  At this point a #22 [place] Storz cystoscope was introduced into the urethra and gently passed into the bladder.  A 3 mm papillary lesion was noted at the 7 o'clock position of the bladder neck.  An area of slightly raised, slightly irregular bladder mucosa was identified in the right posterior wall.  The remainder of the bladder was carefully inspected and no other obvious lesions, stones foreign bodies or diverticuli were noted.  The ureteral orifice ease were in the normal anatomic position and effluxing clear urine bilaterally.  There was modest prostatic hypertrophy.  At this point using cold cup biopsy forceps the previously identified lesions at the 7 o'clock position of the bladder neck and the right posterior wall were then excised and sent to pathology for evaluation.  Using a Bugbee electrode the biopsy sites were then cauterized in their entirety.  After ensuring adequate hemostasis the bladder was  drained and the cystoscope was removed.  The patient was placed in the supine position, awakened and transferred to the recovery room in satisfactory condition    Complications:  None; patient tolerated the procedure well.     Disposition: PACU - hemodynamically stable.  Condition: stable  Specimens Collected:   ID Type Source Tests Collected by Time   1 : RIGHT POSTERIOR BLADDER WALL Tissue BLADDER BIOPSY SURGICAL PATHOLOGY EXAM Sudheer Matthew MD 6/3/2025 7866   2 : BLADDER NECK Tissue BLADDER BIOPSY SURGICAL PATHOLOGY EXAM Sudheer Matthew MD 6/3/2025 1042     Attending Attestation: I was present and scrubbed for the entire procedure.    Sudheer Matthew  Phone Number: [phone]

## 2025-06-03 NOTE — ANESTHESIA PREPROCEDURE EVALUATION
"Patient: Andi Jovel \"Cyrus\"    Procedure Information       Date/Time: 06/03/25 1030    Procedure: CYSTOSCOPY/ LESION EXCISION    Location: [facility] OR 03 / [facility]    Surgeons: Sudheer Matthew MD            Relevant Problems   Cardiac   (+) Arrhythmia, atrial   (+) Hyperlipidemia      GI   (+) Gastroesophageal reflux disease      /Renal   (+) BPH (benign prostatic hyperplasia)      Endocrine   (+) Diabetic neuropathy (Multi)   (+) Type 2 diabetes mellitus      Hematology   (+) Anemia      Musculoskeletal   (+) Osteoarthritis of both knees      HEENT   (+) Chronic sinusitis       Clinical information reviewed:      Problems              NPO Detail:  No data recorded     Physical Exam    Airway  Mallampati: II  TM distance: >3 FB  Neck ROM: full  Mouth opening: 3 or more finger widths     Cardiovascular - normal exam  Rhythm: regular  Rate: normal     Dental - normal exam    (+) upper dentures, lower dentures     Pulmonary - normal exam   Abdominal            Anesthesia Plan    History of general anesthesia?: yes  History of complications of general anesthesia?: no    ASA 3     general     The patient is not a current smoker.    intravenous induction   Postoperative pain plan includes opioids.  Trial extubation is planned.  Anesthetic plan and risks discussed with patient.    Plan discussed with CAA.      "

## 2025-06-03 NOTE — ANESTHESIA POSTPROCEDURE EVALUATION
"Patient: Andi Jovel \"Cyrus\"    Procedure Summary       Date: 06/03/25 Room / Location: [facility] OR 03 / [facility]    Anesthesia Start: 1024 Anesthesia Stop: 1101    Procedure: CYSTOSCOPY/ LESION EXCISION Diagnosis:       Malignant tumor of bladder neck (Multi)      (Malignant tumor of bladder neck (Multi) [C67.5])    Surgeons: Sudheer Matthew MD Responsible Provider: Johnson Diaz MD    Anesthesia Type: general ASA Status: 3            Anesthesia Type: general    Vitals Value Taken Time   /54 06/03/25 11:01   Temp 36.9 06/03/25 11:02   Pulse 52 06/03/25 11:02   Resp 16 06/03/25 11:02   SpO2 94 % 06/03/25 11:00   Vitals shown include unfiled device data.    Anesthesia Post Evaluation    Patient participation: complete - patient participated  Level of consciousness: awake  Pain management: adequate  Airway patency: patent  Cardiovascular status: acceptable  Respiratory status: acceptable  Hydration status: acceptable  Postoperative Nausea and Vomiting: none        No notable events documented.    "

## 2025-06-03 NOTE — ANESTHESIA PROCEDURE NOTES
Airway  Date/Time: 6/3/2025 10:32 AM  Reason: elective    Airway not difficult    Staffing  Performed: KURTIS   Authorized by: Johnson Diaz MD    Performed by: KURTIS Herrera  Patient location during procedure: OR    Patient Condition  Indications for airway management: anesthesia  Sedation level: deep     Final Airway Details   Preoxygenated: yes  Final airway type: supraglottic airway  Successful airway:   Size: 5  Number of attempts at approach: 1  Number of other approaches attempted: 0

## 2025-06-04 LAB
ATRIAL RATE: 57 BPM
P AXIS: 73 DEGREES
P OFFSET: 127 MS
P ONSET: 81 MS
PR INTERVAL: 260 MS
Q ONSET: 211 MS
QRS COUNT: 9 BEATS
QRS DURATION: 138 MS
QT INTERVAL: 484 MS
QTC CALCULATION(BAZETT): 471 MS
QTC FREDERICIA: 475 MS
R AXIS: -58 DEGREES
T AXIS: 48 DEGREES
T OFFSET: 453 MS
VENTRICULAR RATE: 57 BPM

## 2025-06-23 ENCOUNTER — APPOINTMENT (OUTPATIENT)
Dept: RADIOLOGY | Facility: HOSPITAL | Age: 75
End: 2025-06-23
Payer: MEDICARE

## 2025-06-24 ENCOUNTER — HOSPITAL ENCOUNTER (OUTPATIENT)
Dept: RADIOLOGY | Facility: HOSPITAL | Age: 75
Discharge: HOME | End: 2025-06-24
Payer: MEDICARE

## 2025-06-24 DIAGNOSIS — M54.16 LUMBAR RADICULOPATHY: ICD-10-CM

## 2025-06-24 PROCEDURE — 73718 MRI LOWER EXTREMITY W/O DYE: CPT | Mod: RIGHT SIDE | Performed by: RADIOLOGY

## 2025-06-24 PROCEDURE — 72148 MRI LUMBAR SPINE W/O DYE: CPT

## 2025-06-24 PROCEDURE — 73718 MRI LOWER EXTREMITY W/O DYE: CPT | Mod: RT

## 2025-06-24 PROCEDURE — 72148 MRI LUMBAR SPINE W/O DYE: CPT | Performed by: RADIOLOGY

## 2025-06-30 DIAGNOSIS — M54.16 LUMBAR RADICULOPATHY: ICD-10-CM

## 2025-07-01 RX ORDER — TRAMADOL HYDROCHLORIDE 50 MG/1
50 TABLET, FILM COATED ORAL EVERY 8 HOURS PRN
Qty: 30 TABLET | Refills: 0 | Status: SHIPPED | OUTPATIENT
Start: 2025-07-01 | End: 2025-07-11

## 2025-07-18 DIAGNOSIS — F41.9 ANXIETY: ICD-10-CM

## 2025-07-18 DIAGNOSIS — F41.9 ANXIETY DUE TO INVASIVE PROCEDURE: ICD-10-CM

## 2025-07-21 RX ORDER — DIAZEPAM 10 MG/1
TABLET ORAL
Qty: 1 TABLET | Refills: 0 | Status: SHIPPED | OUTPATIENT
Start: 2025-07-21

## 2025-07-24 DIAGNOSIS — E11.9 TYPE 2 DIABETES MELLITUS WITHOUT COMPLICATION, WITHOUT LONG-TERM CURRENT USE OF INSULIN: ICD-10-CM

## 2025-07-24 DIAGNOSIS — E11.9 TYPE 2 DIABETES MELLITUS WITHOUT COMPLICATION, UNSPECIFIED WHETHER LONG TERM INSULIN USE: ICD-10-CM

## 2025-07-25 ENCOUNTER — PATIENT MESSAGE (OUTPATIENT)
Dept: PRIMARY CARE | Facility: CLINIC | Age: 75
End: 2025-07-25
Payer: MEDICARE

## 2025-07-25 DIAGNOSIS — N40.1 BENIGN PROSTATIC HYPERPLASIA WITH WEAK URINARY STREAM: Primary | ICD-10-CM

## 2025-07-25 DIAGNOSIS — R39.12 BENIGN PROSTATIC HYPERPLASIA WITH WEAK URINARY STREAM: Primary | ICD-10-CM

## 2025-07-25 RX ORDER — GLIMEPIRIDE 2 MG/1
2 TABLET ORAL DAILY
Qty: 90 TABLET | Refills: 1 | Status: SHIPPED | OUTPATIENT
Start: 2025-07-25

## 2025-07-25 RX ORDER — PIOGLITAZONE 15 MG/1
15 TABLET ORAL DAILY
Qty: 90 TABLET | Refills: 3 | Status: SHIPPED | OUTPATIENT
Start: 2025-07-25

## 2025-07-26 RX ORDER — TAMSULOSIN HYDROCHLORIDE 0.4 MG/1
0.4 CAPSULE ORAL 2 TIMES DAILY
Qty: 180 CAPSULE | Refills: 3 | Status: SHIPPED | OUTPATIENT
Start: 2025-07-26 | End: 2026-07-26

## 2025-08-06 ENCOUNTER — APPOINTMENT (OUTPATIENT)
Dept: UROLOGY | Facility: CLINIC | Age: 75
End: 2025-08-06
Payer: MEDICARE

## 2025-08-22 DIAGNOSIS — M54.16 LUMBAR RADICULOPATHY: ICD-10-CM

## 2025-08-24 DIAGNOSIS — M51.26 LUMBAR HERNIATED DISC: Primary | ICD-10-CM

## 2025-08-24 RX ORDER — TRAMADOL HYDROCHLORIDE 50 MG/1
50 TABLET, FILM COATED ORAL EVERY 8 HOURS PRN
Qty: 30 TABLET | Refills: 0 | Status: SHIPPED | OUTPATIENT
Start: 2025-08-24 | End: 2025-09-03

## 2025-09-17 ENCOUNTER — APPOINTMENT (OUTPATIENT)
Age: 75
End: 2025-09-17
Payer: MEDICARE

## (undated) DEVICE — COLLECTION BAG, FLUID, NON-STERILE

## (undated) DEVICE — Device

## (undated) DEVICE — TUBING, CLEAR N-COND, 5MM X 10, LF

## (undated) DEVICE — LOOP, BIPOLAR CUTTING, 24/26 FR, F/BLADDER, 0.30MM, STERILE

## (undated) DEVICE — SOLUTION, INJECTION, CONTRAST, OMNIPAQUE 240MG 50ML, PLUS PAK

## (undated) DEVICE — IRRIGATION SET, CYSTOSCOPY, TURP, Y, CONTINUOUS, 81 IN